# Patient Record
Sex: FEMALE | Race: WHITE | Employment: OTHER | URBAN - NONMETROPOLITAN AREA
[De-identification: names, ages, dates, MRNs, and addresses within clinical notes are randomized per-mention and may not be internally consistent; named-entity substitution may affect disease eponyms.]

---

## 2024-04-18 NOTE — PROGRESS NOTES
PAT VISIT  Appointment reminder call given  Date: 4/24  Arrival time: 14:00 and location; [facility]   Bring Drivers license and insurance  Bring Medications in original bottles  Please be ready to give Urine Sample  If possible bring caregiver for appointment  Take am medications with water unless you are holding any for surgery  Appointment may last 2 hours

## 2024-04-30 ENCOUNTER — ANESTHESIA EVENT (OUTPATIENT)
Dept: OPERATING ROOM | Age: 69
End: 2024-04-30
Payer: MEDICARE

## 2024-05-01 ENCOUNTER — ANESTHESIA (OUTPATIENT)
Dept: OPERATING ROOM | Age: 69
End: 2024-05-01
Payer: MEDICARE

## 2024-05-01 ENCOUNTER — HOSPITAL ENCOUNTER (INPATIENT)
Age: 69
LOS: 1 days | Discharge: NURSING, REHAB OR LONG-TERM CARE | DRG: 454 | End: 2024-05-07
Attending: ORTHOPAEDIC SURGERY | Admitting: ORTHOPAEDIC SURGERY
Payer: MEDICARE

## 2024-05-01 ENCOUNTER — APPOINTMENT (OUTPATIENT)
Dept: GENERAL RADIOLOGY | Age: 69
DRG: 454 | End: 2024-05-01
Attending: ORTHOPAEDIC SURGERY
Payer: MEDICARE

## 2024-05-01 DIAGNOSIS — Z98.1 S/P LUMBAR SPINAL FUSION: ICD-10-CM

## 2024-05-01 DIAGNOSIS — M79.661 BILATERAL CALF PAIN: Primary | ICD-10-CM

## 2024-05-01 DIAGNOSIS — M79.662 BILATERAL CALF PAIN: Primary | ICD-10-CM

## 2024-05-01 PROBLEM — M48.062 SPINAL STENOSIS OF LUMBAR REGION WITH NEUROGENIC CLAUDICATION: Status: ACTIVE | Noted: 2024-05-01

## 2024-05-01 LAB
ABO: NORMAL
ANTIBODY SCREEN: NORMAL
BASOPHILS ABSOLUTE: 0 THOU/MM3 (ref 0–0.1)
BASOPHILS NFR BLD AUTO: 0.2 %
DEPRECATED RDW RBC AUTO: 43.3 FL (ref 35–45)
EOSINOPHIL NFR BLD AUTO: 1.2 %
EOSINOPHILS ABSOLUTE: 0.1 THOU/MM3 (ref 0–0.4)
ERYTHROCYTE [DISTWIDTH] IN BLOOD BY AUTOMATED COUNT: 13.2 % (ref 11.5–14.5)
GLUCOSE BLD STRIP.AUTO-MCNC: 265 MG/DL (ref 70–108)
GLUCOSE BLD STRIP.AUTO-MCNC: 276 MG/DL (ref 70–108)
GLUCOSE BLD STRIP.AUTO-MCNC: 294 MG/DL (ref 70–108)
GLUCOSE BLD STRIP.AUTO-MCNC: 299 MG/DL (ref 70–108)
GLUCOSE BLD STRIP.AUTO-MCNC: 308 MG/DL (ref 70–108)
HCT VFR BLD AUTO: 26.4 % (ref 37–47)
HGB BLD-MCNC: 8.9 GM/DL (ref 12–16)
IMM GRANULOCYTES # BLD AUTO: 0.01 THOU/MM3 (ref 0–0.07)
IMM GRANULOCYTES NFR BLD AUTO: 0.2 %
LYMPHOCYTES ABSOLUTE: 1.2 THOU/MM3 (ref 1–4.8)
LYMPHOCYTES NFR BLD AUTO: 25.8 %
MCH RBC QN AUTO: 30.5 PG (ref 26–33)
MCHC RBC AUTO-ENTMCNC: 33.7 GM/DL (ref 32.2–35.5)
MCV RBC AUTO: 90.4 FL (ref 81–99)
MONOCYTES ABSOLUTE: 0.3 THOU/MM3 (ref 0.4–1.3)
MONOCYTES NFR BLD AUTO: 6.4 %
NEUTROPHILS ABSOLUTE: 3.2 THOU/MM3 (ref 1.8–7.7)
NEUTROPHILS NFR BLD AUTO: 66.2 %
NRBC BLD AUTO-RTO: 0 /100 WBC
PLATELET # BLD AUTO: 176 THOU/MM3 (ref 130–400)
PMV BLD AUTO: 9.2 FL (ref 9.4–12.4)
POTASSIUM SERPL-SCNC: 4.5 MEQ/L (ref 3.5–5.2)
RBC # BLD AUTO: 2.92 MILL/MM3 (ref 4.2–5.4)
RH FACTOR: NORMAL
WBC # BLD AUTO: 4.8 THOU/MM3 (ref 4.8–10.8)

## 2024-05-01 PROCEDURE — 3700000001 HC ADD 15 MINUTES (ANESTHESIA): Performed by: ORTHOPAEDIC SURGERY

## 2024-05-01 PROCEDURE — 6360000002 HC RX W HCPCS: Performed by: NURSE ANESTHETIST, CERTIFIED REGISTERED

## 2024-05-01 PROCEDURE — 72020 X-RAY EXAM OF SPINE 1 VIEW: CPT

## 2024-05-01 PROCEDURE — 36415 COLL VENOUS BLD VENIPUNCTURE: CPT

## 2024-05-01 PROCEDURE — 01NB0ZZ RELEASE LUMBAR NERVE, OPEN APPROACH: ICD-10-PCS | Performed by: ORTHOPAEDIC SURGERY

## 2024-05-01 PROCEDURE — 6370000000 HC RX 637 (ALT 250 FOR IP): Performed by: NURSE PRACTITIONER

## 2024-05-01 PROCEDURE — 0SG1071 FUSION OF 2 OR MORE LUMBAR VERTEBRAL JOINTS WITH AUTOLOGOUS TISSUE SUBSTITUTE, POSTERIOR APPROACH, POSTERIOR COLUMN, OPEN APPROACH: ICD-10-PCS | Performed by: ORTHOPAEDIC SURGERY

## 2024-05-01 PROCEDURE — C1889 IMPLANT/INSERT DEVICE, NOC: HCPCS | Performed by: ORTHOPAEDIC SURGERY

## 2024-05-01 PROCEDURE — 2709999900 HC NON-CHARGEABLE SUPPLY: Performed by: ORTHOPAEDIC SURGERY

## 2024-05-01 PROCEDURE — 2500000003 HC RX 250 WO HCPCS: Performed by: NURSE ANESTHETIST, CERTIFIED REGISTERED

## 2024-05-01 PROCEDURE — 86901 BLOOD TYPING SEROLOGIC RH(D): CPT

## 2024-05-01 PROCEDURE — 7100000001 HC PACU RECOVERY - ADDTL 15 MIN: Performed by: ORTHOPAEDIC SURGERY

## 2024-05-01 PROCEDURE — 0SG30AJ FUSION OF LUMBOSACRAL JOINT WITH INTERBODY FUSION DEVICE, POSTERIOR APPROACH, ANTERIOR COLUMN, OPEN APPROACH: ICD-10-PCS | Performed by: ORTHOPAEDIC SURGERY

## 2024-05-01 PROCEDURE — 0SG3071 FUSION OF LUMBOSACRAL JOINT WITH AUTOLOGOUS TISSUE SUBSTITUTE, POSTERIOR APPROACH, POSTERIOR COLUMN, OPEN APPROACH: ICD-10-PCS | Performed by: ORTHOPAEDIC SURGERY

## 2024-05-01 PROCEDURE — 6360000002 HC RX W HCPCS: Performed by: PHYSICIAN ASSISTANT

## 2024-05-01 PROCEDURE — 3600000014 HC SURGERY LEVEL 4 ADDTL 15MIN: Performed by: ORTHOPAEDIC SURGERY

## 2024-05-01 PROCEDURE — 3600000004 HC SURGERY LEVEL 4 BASE: Performed by: ORTHOPAEDIC SURGERY

## 2024-05-01 PROCEDURE — 7100000000 HC PACU RECOVERY - FIRST 15 MIN: Performed by: ORTHOPAEDIC SURGERY

## 2024-05-01 PROCEDURE — 0SP304Z REMOVAL OF INTERNAL FIXATION DEVICE FROM LUMBOSACRAL JOINT, OPEN APPROACH: ICD-10-PCS | Performed by: ORTHOPAEDIC SURGERY

## 2024-05-01 PROCEDURE — 72100 X-RAY EXAM L-S SPINE 2/3 VWS: CPT

## 2024-05-01 PROCEDURE — 0ST40ZZ RESECTION OF LUMBOSACRAL DISC, OPEN APPROACH: ICD-10-PCS | Performed by: ORTHOPAEDIC SURGERY

## 2024-05-01 PROCEDURE — 86900 BLOOD TYPING SEROLOGIC ABO: CPT

## 2024-05-01 PROCEDURE — 2580000003 HC RX 258: Performed by: NURSE PRACTITIONER

## 2024-05-01 PROCEDURE — 2720000010 HC SURG SUPPLY STERILE: Performed by: ORTHOPAEDIC SURGERY

## 2024-05-01 PROCEDURE — G0378 HOSPITAL OBSERVATION PER HR: HCPCS

## 2024-05-01 PROCEDURE — 2580000003 HC RX 258: Performed by: NURSE ANESTHETIST, CERTIFIED REGISTERED

## 2024-05-01 PROCEDURE — 2580000003 HC RX 258: Performed by: PHYSICIAN ASSISTANT

## 2024-05-01 PROCEDURE — 6360000002 HC RX W HCPCS: Performed by: NURSE PRACTITIONER

## 2024-05-01 PROCEDURE — C1713 ANCHOR/SCREW BN/BN,TIS/BN: HCPCS | Performed by: ORTHOPAEDIC SURGERY

## 2024-05-01 PROCEDURE — 85025 COMPLETE CBC W/AUTO DIFF WBC: CPT

## 2024-05-01 PROCEDURE — 82948 REAGENT STRIP/BLOOD GLUCOSE: CPT

## 2024-05-01 PROCEDURE — 6370000000 HC RX 637 (ALT 250 FOR IP): Performed by: ANESTHESIOLOGY

## 2024-05-01 PROCEDURE — 86850 RBC ANTIBODY SCREEN: CPT

## 2024-05-01 PROCEDURE — 84132 ASSAY OF SERUM POTASSIUM: CPT

## 2024-05-01 PROCEDURE — 3700000000 HC ANESTHESIA ATTENDED CARE: Performed by: ORTHOPAEDIC SURGERY

## 2024-05-01 PROCEDURE — 86923 COMPATIBILITY TEST ELECTRIC: CPT

## 2024-05-01 DEVICE — ROD, TI, PREBENT, 5.5X120
Type: IMPLANTABLE DEVICE | Site: SPINE LUMBAR | Status: FUNCTIONAL
Brand: CORTERA

## 2024-05-01 DEVICE — SCREW, POLY, SOLID, 6.5X45
Type: IMPLANTABLE DEVICE | Site: SPINE LUMBAR | Status: FUNCTIONAL
Brand: CORTERA

## 2024-05-01 DEVICE — CAGE SPNL 6 DEG 26X10X10 MM TIPLUS TECHNOLOGY FORTILINK-TS: Type: IMPLANTABLE DEVICE | Site: SPINE LUMBAR | Status: FUNCTIONAL

## 2024-05-01 DEVICE — GRAFT BNE 10 CC DBM FIBREX: Type: IMPLANTABLE DEVICE | Site: SPINE LUMBAR | Status: FUNCTIONAL

## 2024-05-01 DEVICE — ALLOGRAFT BNE BRIDGE 100X25 MM 46 CC BIOADAPT: Type: IMPLANTABLE DEVICE | Site: SPINE LUMBAR | Status: FUNCTIONAL

## 2024-05-01 DEVICE — SET SCREW, 5.5-6.0
Type: IMPLANTABLE DEVICE | Site: SPINE LUMBAR | Status: FUNCTIONAL
Brand: CORTERA

## 2024-05-01 DEVICE — SCREW, POLY, SOLID, 5.5X45
Type: IMPLANTABLE DEVICE | Site: SPINE LUMBAR | Status: FUNCTIONAL
Brand: CORTERA

## 2024-05-01 DEVICE — SCREW, POLY, SOLID, 7.5X45
Type: IMPLANTABLE DEVICE | Site: SPINE LUMBAR | Status: FUNCTIONAL
Brand: CORTERA

## 2024-05-01 DEVICE — GRAFT BNE LG: Type: IMPLANTABLE DEVICE | Site: SPINE LUMBAR | Status: FUNCTIONAL

## 2024-05-01 RX ORDER — MIDAZOLAM HYDROCHLORIDE 1 MG/ML
INJECTION INTRAMUSCULAR; INTRAVENOUS PRN
Status: DISCONTINUED | OUTPATIENT
Start: 2024-05-01 | End: 2024-05-01 | Stop reason: SDUPTHER

## 2024-05-01 RX ORDER — ONDANSETRON 4 MG/1
4 TABLET, ORALLY DISINTEGRATING ORAL EVERY 8 HOURS PRN
Status: DISCONTINUED | OUTPATIENT
Start: 2024-05-01 | End: 2024-05-07 | Stop reason: HOSPADM

## 2024-05-01 RX ORDER — ENEMA 19; 7 G/133ML; G/133ML
1 ENEMA RECTAL DAILY PRN
Status: DISCONTINUED | OUTPATIENT
Start: 2024-05-01 | End: 2024-05-07 | Stop reason: HOSPADM

## 2024-05-01 RX ORDER — MORPHINE SULFATE 2 MG/ML
2 INJECTION, SOLUTION INTRAMUSCULAR; INTRAVENOUS
Status: ACTIVE | OUTPATIENT
Start: 2024-05-01 | End: 2024-05-02

## 2024-05-01 RX ORDER — TRANEXAMIC ACID 100 MG/ML
INJECTION, SOLUTION INTRAVENOUS PRN
Status: DISCONTINUED | OUTPATIENT
Start: 2024-05-01 | End: 2024-05-01 | Stop reason: SDUPTHER

## 2024-05-01 RX ORDER — SODIUM CHLORIDE 0.9 % (FLUSH) 0.9 %
5-40 SYRINGE (ML) INJECTION EVERY 12 HOURS SCHEDULED
Status: DISCONTINUED | OUTPATIENT
Start: 2024-05-01 | End: 2024-05-07 | Stop reason: HOSPADM

## 2024-05-01 RX ORDER — POLYETHYLENE GLYCOL 3350 17 G/17G
17 POWDER, FOR SOLUTION ORAL DAILY
Status: DISCONTINUED | OUTPATIENT
Start: 2024-05-01 | End: 2024-05-07 | Stop reason: HOSPADM

## 2024-05-01 RX ORDER — SODIUM CHLORIDE 9 MG/ML
INJECTION, SOLUTION INTRAVENOUS CONTINUOUS
Status: DISCONTINUED | OUTPATIENT
Start: 2024-05-01 | End: 2024-05-02

## 2024-05-01 RX ORDER — INSULIN LISPRO 100 [IU]/ML
0-8 INJECTION, SOLUTION INTRAVENOUS; SUBCUTANEOUS
Status: DISCONTINUED | OUTPATIENT
Start: 2024-05-01 | End: 2024-05-03

## 2024-05-01 RX ORDER — SODIUM CHLORIDE 0.9 % (FLUSH) 0.9 %
5-40 SYRINGE (ML) INJECTION PRN
Status: DISCONTINUED | OUTPATIENT
Start: 2024-05-01 | End: 2024-05-07 | Stop reason: HOSPADM

## 2024-05-01 RX ORDER — ONDANSETRON 2 MG/ML
4 INJECTION INTRAMUSCULAR; INTRAVENOUS EVERY 6 HOURS PRN
Status: DISCONTINUED | OUTPATIENT
Start: 2024-05-01 | End: 2024-05-07 | Stop reason: HOSPADM

## 2024-05-01 RX ORDER — SENNOSIDES A AND B 8.6 MG/1
1 TABLET, FILM COATED ORAL NIGHTLY
Status: DISCONTINUED | OUTPATIENT
Start: 2024-05-01 | End: 2024-05-03

## 2024-05-01 RX ORDER — SODIUM CHLORIDE 0.9 % (FLUSH) 0.9 %
5-40 SYRINGE (ML) INJECTION PRN
Status: DISCONTINUED | OUTPATIENT
Start: 2024-05-01 | End: 2024-05-01 | Stop reason: HOSPADM

## 2024-05-01 RX ORDER — SODIUM CHLORIDE 9 MG/ML
INJECTION, SOLUTION INTRAVENOUS CONTINUOUS PRN
Status: DISCONTINUED | OUTPATIENT
Start: 2024-05-01 | End: 2024-05-01 | Stop reason: SDUPTHER

## 2024-05-01 RX ORDER — BETHANECHOL CHLORIDE 25 MG/1
25 TABLET ORAL 3 TIMES DAILY
Status: DISCONTINUED | OUTPATIENT
Start: 2024-05-01 | End: 2024-05-07 | Stop reason: HOSPADM

## 2024-05-01 RX ORDER — ONDANSETRON 2 MG/ML
INJECTION INTRAMUSCULAR; INTRAVENOUS PRN
Status: DISCONTINUED | OUTPATIENT
Start: 2024-05-01 | End: 2024-05-01 | Stop reason: SDUPTHER

## 2024-05-01 RX ORDER — FENTANYL CITRATE 50 UG/ML
INJECTION, SOLUTION INTRAMUSCULAR; INTRAVENOUS PRN
Status: DISCONTINUED | OUTPATIENT
Start: 2024-05-01 | End: 2024-05-01 | Stop reason: SDUPTHER

## 2024-05-01 RX ORDER — PROPOFOL 10 MG/ML
INJECTION, EMULSION INTRAVENOUS PRN
Status: DISCONTINUED | OUTPATIENT
Start: 2024-05-01 | End: 2024-05-01 | Stop reason: SDUPTHER

## 2024-05-01 RX ORDER — GLYCOPYRROLATE 0.2 MG/ML
INJECTION INTRAMUSCULAR; INTRAVENOUS PRN
Status: DISCONTINUED | OUTPATIENT
Start: 2024-05-01 | End: 2024-05-01 | Stop reason: SDUPTHER

## 2024-05-01 RX ORDER — LIDOCAINE HYDROCHLORIDE 20 MG/ML
INJECTION, SOLUTION INTRAVENOUS PRN
Status: DISCONTINUED | OUTPATIENT
Start: 2024-05-01 | End: 2024-05-01 | Stop reason: SDUPTHER

## 2024-05-01 RX ORDER — SODIUM CHLORIDE 9 MG/ML
INJECTION, SOLUTION INTRAVENOUS CONTINUOUS
Status: DISCONTINUED | OUTPATIENT
Start: 2024-05-01 | End: 2024-05-01 | Stop reason: HOSPADM

## 2024-05-01 RX ORDER — OXYCODONE HYDROCHLORIDE AND ACETAMINOPHEN 5; 325 MG/1; MG/1
2 TABLET ORAL EVERY 4 HOURS PRN
Status: DISCONTINUED | OUTPATIENT
Start: 2024-05-01 | End: 2024-05-07 | Stop reason: HOSPADM

## 2024-05-01 RX ORDER — SODIUM CHLORIDE 0.9 % (FLUSH) 0.9 %
5-40 SYRINGE (ML) INJECTION EVERY 12 HOURS SCHEDULED
Status: DISCONTINUED | OUTPATIENT
Start: 2024-05-01 | End: 2024-05-01 | Stop reason: HOSPADM

## 2024-05-01 RX ORDER — SODIUM CHLORIDE 9 MG/ML
INJECTION, SOLUTION INTRAVENOUS PRN
Status: DISCONTINUED | OUTPATIENT
Start: 2024-05-01 | End: 2024-05-07 | Stop reason: HOSPADM

## 2024-05-01 RX ORDER — DEXAMETHASONE SODIUM PHOSPHATE 10 MG/ML
INJECTION, EMULSION INTRAMUSCULAR; INTRAVENOUS PRN
Status: DISCONTINUED | OUTPATIENT
Start: 2024-05-01 | End: 2024-05-01 | Stop reason: SDUPTHER

## 2024-05-01 RX ORDER — METHENAMINE HIPPURATE 1000 MG/1
1 TABLET ORAL 2 TIMES DAILY WITH MEALS
Status: DISCONTINUED | OUTPATIENT
Start: 2024-05-01 | End: 2024-05-07 | Stop reason: HOSPADM

## 2024-05-01 RX ORDER — ACETAMINOPHEN 325 MG/1
650 TABLET ORAL EVERY 4 HOURS PRN
Status: DISCONTINUED | OUTPATIENT
Start: 2024-05-01 | End: 2024-05-07 | Stop reason: HOSPADM

## 2024-05-01 RX ORDER — ATENOLOL 50 MG/1
50 TABLET ORAL DAILY
Status: DISCONTINUED | OUTPATIENT
Start: 2024-05-02 | End: 2024-05-07 | Stop reason: HOSPADM

## 2024-05-01 RX ORDER — DOCUSATE SODIUM 100 MG/1
100 CAPSULE, LIQUID FILLED ORAL 2 TIMES DAILY
Status: DISCONTINUED | OUTPATIENT
Start: 2024-05-01 | End: 2024-05-07 | Stop reason: HOSPADM

## 2024-05-01 RX ORDER — MORPHINE SULFATE 4 MG/ML
4 INJECTION, SOLUTION INTRAMUSCULAR; INTRAVENOUS
Status: ACTIVE | OUTPATIENT
Start: 2024-05-01 | End: 2024-05-02

## 2024-05-01 RX ORDER — LISINOPRIL 5 MG/1
5 TABLET ORAL DAILY
Status: DISCONTINUED | OUTPATIENT
Start: 2024-05-01 | End: 2024-05-07 | Stop reason: HOSPADM

## 2024-05-01 RX ORDER — ROCURONIUM BROMIDE 10 MG/ML
INJECTION, SOLUTION INTRAVENOUS PRN
Status: DISCONTINUED | OUTPATIENT
Start: 2024-05-01 | End: 2024-05-01 | Stop reason: SDUPTHER

## 2024-05-01 RX ORDER — DEXTROSE MONOHYDRATE 100 MG/ML
INJECTION, SOLUTION INTRAVENOUS CONTINUOUS PRN
Status: DISCONTINUED | OUTPATIENT
Start: 2024-05-01 | End: 2024-05-07 | Stop reason: HOSPADM

## 2024-05-01 RX ORDER — TAMSULOSIN HYDROCHLORIDE 0.4 MG/1
0.4 CAPSULE ORAL DAILY
Status: DISCONTINUED | OUTPATIENT
Start: 2024-05-01 | End: 2024-05-03

## 2024-05-01 RX ORDER — BISACODYL 10 MG
10 SUPPOSITORY, RECTAL RECTAL DAILY PRN
Status: DISCONTINUED | OUTPATIENT
Start: 2024-05-01 | End: 2024-05-06

## 2024-05-01 RX ORDER — OXYCODONE HYDROCHLORIDE AND ACETAMINOPHEN 5; 325 MG/1; MG/1
1 TABLET ORAL EVERY 4 HOURS PRN
Status: DISCONTINUED | OUTPATIENT
Start: 2024-05-01 | End: 2024-05-07 | Stop reason: HOSPADM

## 2024-05-01 RX ORDER — INSULIN LISPRO 100 [IU]/ML
0-4 INJECTION, SOLUTION INTRAVENOUS; SUBCUTANEOUS NIGHTLY
Status: DISCONTINUED | OUTPATIENT
Start: 2024-05-01 | End: 2024-05-03

## 2024-05-01 RX ORDER — CYCLOBENZAPRINE HCL 10 MG
10 TABLET ORAL 3 TIMES DAILY PRN
Status: DISCONTINUED | OUTPATIENT
Start: 2024-05-01 | End: 2024-05-07 | Stop reason: HOSPADM

## 2024-05-01 RX ORDER — PHENYLEPHRINE HCL IN 0.9% NACL 1 MG/10 ML
SYRINGE (ML) INTRAVENOUS PRN
Status: DISCONTINUED | OUTPATIENT
Start: 2024-05-01 | End: 2024-05-01 | Stop reason: SDUPTHER

## 2024-05-01 RX ADMIN — SODIUM CHLORIDE: 9 INJECTION, SOLUTION INTRAVENOUS at 11:30

## 2024-05-01 RX ADMIN — FENTANYL CITRATE 50 MCG: 50 INJECTION, SOLUTION INTRAMUSCULAR; INTRAVENOUS at 08:53

## 2024-05-01 RX ADMIN — FENTANYL CITRATE 50 MCG: 50 INJECTION, SOLUTION INTRAMUSCULAR; INTRAVENOUS at 13:05

## 2024-05-01 RX ADMIN — ROCURONIUM BROMIDE 10 MG: 10 INJECTION INTRAVENOUS at 09:49

## 2024-05-01 RX ADMIN — BETHANECHOL CHLORIDE 25 MG: 25 TABLET ORAL at 20:19

## 2024-05-01 RX ADMIN — METFORMIN HYDROCHLORIDE 1000 MG: 500 TABLET ORAL at 15:35

## 2024-05-01 RX ADMIN — ROCURONIUM BROMIDE 10 MG: 10 INJECTION INTRAVENOUS at 11:34

## 2024-05-01 RX ADMIN — PROPOFOL 150 MG: 10 INJECTION, EMULSION INTRAVENOUS at 08:53

## 2024-05-01 RX ADMIN — SUGAMMADEX 200 MG: 100 INJECTION, SOLUTION INTRAVENOUS at 13:24

## 2024-05-01 RX ADMIN — SODIUM CHLORIDE: 9 INJECTION, SOLUTION INTRAVENOUS at 07:31

## 2024-05-01 RX ADMIN — OXYCODONE HYDROCHLORIDE AND ACETAMINOPHEN 2 TABLET: 5; 325 TABLET ORAL at 20:19

## 2024-05-01 RX ADMIN — GLYCOPYRROLATE 0.2 MG: 0.2 INJECTION INTRAMUSCULAR; INTRAVENOUS at 11:28

## 2024-05-01 RX ADMIN — ROCURONIUM BROMIDE 10 MG: 10 INJECTION INTRAVENOUS at 10:14

## 2024-05-01 RX ADMIN — ROCURONIUM BROMIDE 10 MG: 10 INJECTION INTRAVENOUS at 10:45

## 2024-05-01 RX ADMIN — SODIUM CHLORIDE: 9 INJECTION, SOLUTION INTRAVENOUS at 08:59

## 2024-05-01 RX ADMIN — METHENAMINE HIPPURATE 1 G: 1 TABLET ORAL at 15:36

## 2024-05-01 RX ADMIN — VANCOMYCIN HYDROCHLORIDE 1000 MG: 1 INJECTION, POWDER, LYOPHILIZED, FOR SOLUTION INTRAVENOUS at 08:15

## 2024-05-01 RX ADMIN — FENTANYL CITRATE 50 MCG: 50 INJECTION, SOLUTION INTRAMUSCULAR; INTRAVENOUS at 10:05

## 2024-05-01 RX ADMIN — FENTANYL CITRATE 50 MCG: 50 INJECTION, SOLUTION INTRAMUSCULAR; INTRAVENOUS at 09:28

## 2024-05-01 RX ADMIN — POLYETHYLENE GLYCOL 3350 17 G: 17 POWDER, FOR SOLUTION ORAL at 15:34

## 2024-05-01 RX ADMIN — WATER 2000 MG: 1 INJECTION INTRAMUSCULAR; INTRAVENOUS; SUBCUTANEOUS at 12:59

## 2024-05-01 RX ADMIN — INSULIN LISPRO 4 UNITS: 100 INJECTION, SOLUTION INTRAVENOUS; SUBCUTANEOUS at 20:19

## 2024-05-01 RX ADMIN — BETHANECHOL CHLORIDE 25 MG: 25 TABLET ORAL at 15:35

## 2024-05-01 RX ADMIN — ROCURONIUM BROMIDE 50 MG: 10 INJECTION INTRAVENOUS at 08:53

## 2024-05-01 RX ADMIN — TRANEXAMIC ACID 1000 MG: 100 INJECTION, SOLUTION INTRAVENOUS at 09:21

## 2024-05-01 RX ADMIN — Medication 6 UNITS: at 07:32

## 2024-05-01 RX ADMIN — DEXAMETHASONE SODIUM PHOSPHATE 10 MG: 10 INJECTION, EMULSION INTRAMUSCULAR; INTRAVENOUS at 09:07

## 2024-05-01 RX ADMIN — SODIUM CHLORIDE: 9 INJECTION, SOLUTION INTRAVENOUS at 10:21

## 2024-05-01 RX ADMIN — SUGAMMADEX 200 MG: 100 INJECTION, SOLUTION INTRAVENOUS at 13:09

## 2024-05-01 RX ADMIN — WATER 2000 MG: 1 INJECTION INTRAMUSCULAR; INTRAVENOUS; SUBCUTANEOUS at 08:59

## 2024-05-01 RX ADMIN — DOCUSATE SODIUM 100 MG: 100 CAPSULE, LIQUID FILLED ORAL at 20:19

## 2024-05-01 RX ADMIN — INSULIN LISPRO 4 UNITS: 100 INJECTION, SOLUTION INTRAVENOUS; SUBCUTANEOUS at 15:34

## 2024-05-01 RX ADMIN — Medication 100 MCG: at 10:24

## 2024-05-01 RX ADMIN — ROCURONIUM BROMIDE 10 MG: 10 INJECTION INTRAVENOUS at 09:29

## 2024-05-01 RX ADMIN — LIDOCAINE HYDROCHLORIDE 100 MG: 20 INJECTION, SOLUTION INTRAVENOUS at 08:53

## 2024-05-01 RX ADMIN — TAMSULOSIN HYDROCHLORIDE 0.4 MG: 0.4 CAPSULE ORAL at 15:35

## 2024-05-01 RX ADMIN — SENNOSIDES 8.6 MG: 8.6 TABLET, FILM COATED ORAL at 20:19

## 2024-05-01 RX ADMIN — ONDANSETRON 4 MG: 2 INJECTION INTRAMUSCULAR; INTRAVENOUS at 11:53

## 2024-05-01 RX ADMIN — MIDAZOLAM 2 MG: 1 INJECTION INTRAMUSCULAR; INTRAVENOUS at 08:50

## 2024-05-01 RX ADMIN — ROCURONIUM BROMIDE 10 MG: 10 INJECTION INTRAVENOUS at 12:11

## 2024-05-01 RX ADMIN — WATER 2000 MG: 1 INJECTION INTRAMUSCULAR; INTRAVENOUS; SUBCUTANEOUS at 20:20

## 2024-05-01 ASSESSMENT — PAIN - FUNCTIONAL ASSESSMENT
PAIN_FUNCTIONAL_ASSESSMENT: 0-10
PAIN_FUNCTIONAL_ASSESSMENT: 0-10
PAIN_FUNCTIONAL_ASSESSMENT: PREVENTS OR INTERFERES SOME ACTIVE ACTIVITIES AND ADLS

## 2024-05-01 ASSESSMENT — PAIN DESCRIPTION - PAIN TYPE: TYPE: SURGICAL PAIN

## 2024-05-01 ASSESSMENT — PAIN DESCRIPTION - LOCATION: LOCATION: BACK

## 2024-05-01 ASSESSMENT — PAIN SCALES - GENERAL
PAINLEVEL_OUTOF10: 10
PAINLEVEL_OUTOF10: 4
PAINLEVEL_OUTOF10: 3

## 2024-05-01 ASSESSMENT — PAIN DESCRIPTION - DESCRIPTORS
DESCRIPTORS: ACHING;DISCOMFORT
DESCRIPTORS: ACHING;DISCOMFORT
DESCRIPTORS: ACHING;SHARP

## 2024-05-01 ASSESSMENT — PAIN DESCRIPTION - ORIENTATION: ORIENTATION: LOWER

## 2024-05-01 ASSESSMENT — PAIN DESCRIPTION - ONSET: ONSET: ON-GOING

## 2024-05-01 ASSESSMENT — PAIN DESCRIPTION - FREQUENCY: FREQUENCY: CONTINUOUS

## 2024-05-01 NOTE — H&P
I have reviewed the history and physical and examined the patient.  I find no relevant changes.  I have reviewed with the patient and/or family members, during the preoperative office visit the risks, benefits, and alternatives to the procedure.    Gentry Ortiz MD

## 2024-05-01 NOTE — ANESTHESIA PRE PROCEDURE
Department of Anesthesiology  Preprocedure Note       Name:  Tawny Rico   Age:  69 y.o.  :  1955                                          MRN:  [id number]         Date:  2024      Surgeon: Surgeon(s):  Gentry Ortiz MD    Procedure: Procedure(s):  Removal of Hardware L4-L5, L2-L4 L5-S1 Laminectomy L2-S1 PSD with Legacy Removal/ Cortera    Medications prior to admission:   Prior to Admission medications    Medication Sig Start Date End Date Taking? Authorizing Provider   methenamine (HIPREX) 1 g tablet Take 1 tablet by mouth 2 times daily (with meals)    Socorro Ashton MD   vitamin B-12 (CYANOCOBALAMIN) 1000 MCG tablet Take 1 tablet by mouth daily    Socorro Ashton MD   SITagliptin (JANUVIA) 100 MG tablet Take 1 tablet by mouth daily    Socorro Ashton MD   metFORMIN (GLUCOPHAGE) 500 MG tablet Take 2 tablets by mouth 2 times daily (with meals)    Socorro Ashton MD   lisinopril (PRINIVIL;ZESTRIL) 5 MG tablet Take 1 tablet by mouth daily    Socorro Ashton MD   atenolol (TENORMIN) 50 MG tablet Take 1 tablet by mouth daily    Socorro Ashton MD   Vitamin D3 125 MCG (5000 UT) TABS tablet Take 1 tablet by mouth daily    Socorro Ashton MD   aspirin 81 MG EC tablet Take 1 tablet by mouth daily    Socorro Ashton MD   tamsulosin (FLOMAX) 0.4 MG capsule Take 1 capsule by mouth daily    Socorro Ashton MD   ibuprofen (ADVIL;MOTRIN) 200 MG tablet Take 1 tablet by mouth every 6 hours as needed for Pain    Socorro Ashton MD   bumetanide (BUMEX) 0.5 MG tablet Take 1 tablet by mouth daily    Socorro Ashton MD   bethanechol (URECHOLINE) 25 MG tablet Take 1 tablet by mouth 3 times daily    Socorro Ashton MD   glipiZIDE (GLUCOTROL) 5 MG tablet Take 1 tablet by mouth 2 times daily (before meals)    Socorro Ashton MD   Multiple Vitamins-Minerals (THERAPEUTIC MULTIVITAMIN-MINERALS) tablet Take 1 tablet by mouth daily    Daisha

## 2024-05-01 NOTE — ANESTHESIA POSTPROCEDURE EVALUATION
Department of Anesthesiology  Postprocedure Note    Patient: Tawny Rico  MRN: [id number]  YOB: 1955  Date of evaluation: 5/1/2024    Procedure Summary       Date: 05/01/24 Room / Location: [facility] OR [address] [place] [address] OR    Anesthesia Start: 0850 Anesthesia Stop: 1329    Procedure: Removal of Hardware Lumbar 4-Lumbar 5, Lumbar 5 to Sacral 1 Posterior Latereal Interbody Fusion, Lumbar 2 to Lumbar 4 Laminectomy and Posterior Spinal Fusion with Local Bone Graft, Demineralized Bone Matrix and Instrumentation (Spine Lumbar) Diagnosis:       Spinal stenosis of lumbar region, unspecified whether neurogenic claudication present      (Spinal stenosis of lumbar region, unspecified whether neurogenic claudication present [M48.061])    Surgeons: Gentry Ortiz MD Responsible Provider: Jed Jeffries DO    Anesthesia Type: general ASA Status: 2            Anesthesia Type: No value filed.    Matthieu Phase I: Matthieu Score: 9    Matthieu Phase II:      Anesthesia Post Evaluation    Patient location during evaluation: PACU  Patient participation: complete - patient participated  Level of consciousness: awake and alert  Airway patency: patent  Nausea & Vomiting: no vomiting and no nausea  Cardiovascular status: hemodynamically stable  Respiratory status: acceptable and room air  Hydration status: stable  Pain management: adequate    No notable events documented.

## 2024-05-01 NOTE — BRIEF OP NOTE
Brief Postoperative Note      Patient: Tawny Rico  YOB: 1955  MRN: [id number]    Date of Procedure: 5/1/2024    Pre-Op Diagnosis Codes:     * Spinal stenosis of lumbar region, unspecified whether neurogenic claudication present [M48.061]    Post-Op Diagnosis: Same       Procedure(s):  Removal of Hardware Lumbar 4-Lumbar 5, Lumbar 5 to Sacral 1 Posterior Latereal Interbody Fusion, Lumbar 2 to Lumbar 4 Laminectomy and Posterior Spinal Fusion with Local Bone Graft, Demineralized Bone Matrix and Instrumentation    Surgeon(s):  Gentry Ortiz MD    Assistant:  Gisel Matthews CNP-FAC    Anesthesia: General    Estimated Blood Loss (mL): 1000    Complications: None    Specimens:   * No specimens in log *    Implants:  Implant Name Type Inv. Item Serial No.  Lot No. LRB No. Used Action   CAGE SPNL 6 DEG [id number] MM [employer] FORT[employer]-TS - [id number]  CAGE SPNL 6 DEG [id number] MM TIPLUS TECHNOLOGY FORTILINK-TS  [employer] [id number] N/A 2 Implanted   GRAFT BNE LG - [id number]  GRAFT BNE LG [id number] BIOLOGICA  N/A 1 Implanted   GRAFT BNE LG - [id number]  GRAFT BNE LG [id number] BIOLOGICA  N/A 1 Implanted   GRAFT BNE 10 CC DBM FIBREX - [id number]  GRAFT BNE 10 CC DBM FIBREX  [employer]  N/A 1 Implanted   ALLOGRAFT BNE BRIDGE 100X25 MM 46 CC BIOADAPT - [id number]  ALLOGRAFT BNE BRIDGE 100X25 MM 46 CC BIOADAPT [id number] [employer] [id number] N/A 1 Implanted   SCREW POLY SOLID 5.5X45 - [id number]  SCREW POLY SOLID 5.5X45  [employer]  N/A 4 Implanted   SCREW POLY SOLID 6.5X45 - [id number]  SCREW POLY SOLID 6.5X45  [employer]  N/A 4 Implanted   SCREW POLY SOLID 7.5X45 - [id number]  SCREW POLY SOLID 7.5X45  [employer]  N/A 2 Implanted   TING TI PREBENT 5.5X120 - [id number]  TING TI PREBENT 5.5X120  [employer]  N/A 2 Implanted   SET SCREW

## 2024-05-01 NOTE — PROGRESS NOTES
1321  - pt arrives to pacu, respirations unlabored on 8 L simple mask, pt states pain 4 out of 10, VSS    1325 - pt given sugammadex by CRNA    1340 - pt resting comfortably, pt states pain tolerable    1345 -report called to 7K RN    1351 - pt meets criteria for discharge from pacu at this time, pt transported to K16 in stable condition

## 2024-05-02 LAB
BASOPHILS ABSOLUTE: 0 THOU/MM3 (ref 0–0.1)
BASOPHILS NFR BLD AUTO: 0.1 %
DEPRECATED RDW RBC AUTO: 46.2 FL (ref 35–45)
EOSINOPHIL NFR BLD AUTO: 0 %
EOSINOPHILS ABSOLUTE: 0 THOU/MM3 (ref 0–0.4)
ERYTHROCYTE [DISTWIDTH] IN BLOOD BY AUTOMATED COUNT: 13.4 % (ref 11.5–14.5)
GLUCOSE BLD STRIP.AUTO-MCNC: 234 MG/DL (ref 70–108)
GLUCOSE BLD STRIP.AUTO-MCNC: 277 MG/DL (ref 70–108)
GLUCOSE BLD STRIP.AUTO-MCNC: 293 MG/DL (ref 70–108)
GLUCOSE BLD STRIP.AUTO-MCNC: 335 MG/DL (ref 70–108)
HCT VFR BLD AUTO: 24 % (ref 37–47)
HGB BLD-MCNC: 7.7 GM/DL (ref 12–16)
IMM GRANULOCYTES # BLD AUTO: 0.06 THOU/MM3 (ref 0–0.07)
IMM GRANULOCYTES NFR BLD AUTO: 0.5 %
LYMPHOCYTES ABSOLUTE: 1.5 THOU/MM3 (ref 1–4.8)
LYMPHOCYTES NFR BLD AUTO: 12.4 %
MCH RBC QN AUTO: 30.2 PG (ref 26–33)
MCHC RBC AUTO-ENTMCNC: 32.1 GM/DL (ref 32.2–35.5)
MCV RBC AUTO: 94.1 FL (ref 81–99)
MONOCYTES ABSOLUTE: 1 THOU/MM3 (ref 0.4–1.3)
MONOCYTES NFR BLD AUTO: 8.3 %
NEUTROPHILS ABSOLUTE: 9.3 THOU/MM3 (ref 1.8–7.7)
NEUTROPHILS NFR BLD AUTO: 78.7 %
NRBC BLD AUTO-RTO: 0 /100 WBC
PLATELET # BLD AUTO: 194 THOU/MM3 (ref 130–400)
PMV BLD AUTO: 9.5 FL (ref 9.4–12.4)
RBC # BLD AUTO: 2.55 MILL/MM3 (ref 4.2–5.4)
WBC # BLD AUTO: 11.8 THOU/MM3 (ref 4.8–10.8)

## 2024-05-02 PROCEDURE — 97166 OT EVAL MOD COMPLEX 45 MIN: CPT

## 2024-05-02 PROCEDURE — 6360000002 HC RX W HCPCS: Performed by: NURSE PRACTITIONER

## 2024-05-02 PROCEDURE — G0378 HOSPITAL OBSERVATION PER HR: HCPCS

## 2024-05-02 PROCEDURE — 2580000003 HC RX 258: Performed by: NURSE PRACTITIONER

## 2024-05-02 PROCEDURE — 51798 US URINE CAPACITY MEASURE: CPT

## 2024-05-02 PROCEDURE — 6370000000 HC RX 637 (ALT 250 FOR IP): Performed by: NURSE PRACTITIONER

## 2024-05-02 PROCEDURE — 97530 THERAPEUTIC ACTIVITIES: CPT

## 2024-05-02 PROCEDURE — 85025 COMPLETE CBC W/AUTO DIFF WBC: CPT

## 2024-05-02 PROCEDURE — 97116 GAIT TRAINING THERAPY: CPT

## 2024-05-02 PROCEDURE — 97163 PT EVAL HIGH COMPLEX 45 MIN: CPT

## 2024-05-02 PROCEDURE — 97535 SELF CARE MNGMENT TRAINING: CPT

## 2024-05-02 PROCEDURE — 82948 REAGENT STRIP/BLOOD GLUCOSE: CPT

## 2024-05-02 PROCEDURE — 36415 COLL VENOUS BLD VENIPUNCTURE: CPT

## 2024-05-02 RX ORDER — FERROUS SULFATE 325(65) MG
325 TABLET ORAL 2 TIMES DAILY WITH MEALS
Status: DISCONTINUED | OUTPATIENT
Start: 2024-05-02 | End: 2024-05-07 | Stop reason: HOSPADM

## 2024-05-02 RX ADMIN — SENNOSIDES 8.6 MG: 8.6 TABLET, FILM COATED ORAL at 19:56

## 2024-05-02 RX ADMIN — BETHANECHOL CHLORIDE 25 MG: 25 TABLET ORAL at 08:32

## 2024-05-02 RX ADMIN — INSULIN LISPRO 4 UNITS: 100 INJECTION, SOLUTION INTRAVENOUS; SUBCUTANEOUS at 12:21

## 2024-05-02 RX ADMIN — OXYCODONE HYDROCHLORIDE AND ACETAMINOPHEN 2 TABLET: 5; 325 TABLET ORAL at 04:19

## 2024-05-02 RX ADMIN — INSULIN LISPRO 4 UNITS: 100 INJECTION, SOLUTION INTRAVENOUS; SUBCUTANEOUS at 17:15

## 2024-05-02 RX ADMIN — WATER 2000 MG: 1 INJECTION INTRAMUSCULAR; INTRAVENOUS; SUBCUTANEOUS at 04:20

## 2024-05-02 RX ADMIN — METFORMIN HYDROCHLORIDE 1000 MG: 500 TABLET ORAL at 08:32

## 2024-05-02 RX ADMIN — DOCUSATE SODIUM 100 MG: 100 CAPSULE, LIQUID FILLED ORAL at 08:33

## 2024-05-02 RX ADMIN — SODIUM CHLORIDE, PRESERVATIVE FREE 10 ML: 5 INJECTION INTRAVENOUS at 19:56

## 2024-05-02 RX ADMIN — OXYCODONE HYDROCHLORIDE AND ACETAMINOPHEN 1 TABLET: 5; 325 TABLET ORAL at 09:36

## 2024-05-02 RX ADMIN — DOCUSATE SODIUM 100 MG: 100 CAPSULE, LIQUID FILLED ORAL at 19:56

## 2024-05-02 RX ADMIN — METHENAMINE HIPPURATE 1 G: 1 TABLET ORAL at 17:15

## 2024-05-02 RX ADMIN — INSULIN LISPRO 2 UNITS: 100 INJECTION, SOLUTION INTRAVENOUS; SUBCUTANEOUS at 08:28

## 2024-05-02 RX ADMIN — FERROUS SULFATE TAB 325 MG (65 MG ELEMENTAL FE) 325 MG: 325 (65 FE) TAB at 17:15

## 2024-05-02 RX ADMIN — METFORMIN HYDROCHLORIDE 1000 MG: 500 TABLET ORAL at 17:15

## 2024-05-02 RX ADMIN — OXYCODONE HYDROCHLORIDE AND ACETAMINOPHEN 2 TABLET: 5; 325 TABLET ORAL at 17:15

## 2024-05-02 RX ADMIN — CYCLOBENZAPRINE 10 MG: 10 TABLET, FILM COATED ORAL at 12:55

## 2024-05-02 RX ADMIN — TAMSULOSIN HYDROCHLORIDE 0.4 MG: 0.4 CAPSULE ORAL at 08:32

## 2024-05-02 RX ADMIN — FERROUS SULFATE TAB 325 MG (65 MG ELEMENTAL FE) 325 MG: 325 (65 FE) TAB at 08:32

## 2024-05-02 RX ADMIN — INSULIN LISPRO 4 UNITS: 100 INJECTION, SOLUTION INTRAVENOUS; SUBCUTANEOUS at 19:57

## 2024-05-02 RX ADMIN — BETHANECHOL CHLORIDE 25 MG: 25 TABLET ORAL at 12:56

## 2024-05-02 RX ADMIN — METHENAMINE HIPPURATE 1 G: 1 TABLET ORAL at 08:31

## 2024-05-02 RX ADMIN — BETHANECHOL CHLORIDE 25 MG: 25 TABLET ORAL at 19:56

## 2024-05-02 ASSESSMENT — PAIN DESCRIPTION - LOCATION
LOCATION: BACK

## 2024-05-02 ASSESSMENT — PAIN DESCRIPTION - ORIENTATION
ORIENTATION: LOWER

## 2024-05-02 ASSESSMENT — PAIN SCALES - GENERAL
PAINLEVEL_OUTOF10: 3
PAINLEVEL_OUTOF10: 8
PAINLEVEL_OUTOF10: 0
PAINLEVEL_OUTOF10: 8

## 2024-05-02 ASSESSMENT — PAIN DESCRIPTION - PAIN TYPE
TYPE: SURGICAL PAIN
TYPE: SURGICAL PAIN

## 2024-05-02 ASSESSMENT — PAIN DESCRIPTION - ONSET: ONSET: ON-GOING

## 2024-05-02 ASSESSMENT — PAIN DESCRIPTION - DESCRIPTORS
DESCRIPTORS: ACHING

## 2024-05-02 ASSESSMENT — PAIN DESCRIPTION - FREQUENCY: FREQUENCY: CONTINUOUS

## 2024-05-02 NOTE — PROGRESS NOTES
Department of Orthopedic Surgery  Progress Note        Subjective:    5/2/24  Tawny complains of incision pain with noted improvement of her pre-op radicular symptoms. She is on 1L of O2 nC, denies any home O2. Patient is edematous will stop IV fluids, remove chavarria cath and sh eis ready to be up with PT/OT.      Vitals  VITALS:  BP (!) 103/53   Pulse 66   Temp 98 °F (36.7 °C) (Oral)   Resp 14   Ht 1.626 m (5' 4\")   Wt 99.3 kg (219 lb)   SpO2 99%   BMI 37.59 kg/m²   24HR INTAKE/OUTPUT:    Intake/Output Summary (Last 24 hours) at 5/2/2024 0704  Last data filed at 5/2/2024 0415  Gross per 24 hour   Intake 3909 ml   Output 2420 ml   Net 1489 ml     URINARY CATHETER OUTPUT (Chavarria):  Urinary Catheter 05/01/24 Chavarria;2 Way-Output (mL): 100 mL  DRAIN/TUBE OUTPUT:  Closed/Suction Drain Inferior;Lateral;Left Back Accordion-Output (ml): 40 ml  Closed/Suction Drain Inferior;Lateral;Right Back Accordion-Output (ml): 60 ml      PHYSICAL EXAM:    Orientation:  alert and oriented to person, place and time    Incision:  dressing in place, clean, dry, intact    ULower Extremity Motor :  quadriceps, extensor hallucis longus, dorsiflexion, plantarflexion 5/5 bilaterally  Lower Extremity Sensory:  Intact L1-S1    Flatus:  negative    ABNORMAL EXAM FINDINGS:  none    LABS:    HgB:    Lab Results   Component Value Date/Time    HGB 7.7 05/02/2024 06:21 AM     CBC with Differential:    Lab Results   Component Value Date/Time    WBC 11.8 05/02/2024 06:21 AM    RBC 2.55 05/02/2024 06:21 AM    HGB 7.7 05/02/2024 06:21 AM    HCT 24.0 05/02/2024 06:21 AM     05/02/2024 06:21 AM    MCV 94.1 05/02/2024 06:21 AM    MCH 30.2 05/02/2024 06:21 AM    MCHC 32.1 05/02/2024 06:21 AM    NRBC 0 05/02/2024 06:21 AM    MONOPCT 8.3 05/02/2024 06:21 AM    MONOSABS 1.0 05/02/2024 06:21 AM    EOSABS 0.0 05/02/2024 06:21 AM    BASOSABS 0.0 05/02/2024 06:21 AM       ASSESSMENT AND PLAN:    Post operative day 1     1:  Monitor labs and drain output  2:

## 2024-05-02 NOTE — PROGRESS NOTES
[facility]  INPATIENT PHYSICAL THERAPY  EVALUATION  [facility] OR[facility] 7K - 7K-16/016-A    Time In: 1035  Time Out: 1107  Timed Code Treatment Minutes: 23 Minutes  Minutes: 32          Date: 2024  Patient Name: Tawny Rico,  Gender:  female        MRN: [id number]  : 1955  (69 y.o.)      Referring Practitioner: SUKH Matthews CNP  Diagnosis: spinal stenosis of lumbar region  Additional Pertinent Hx: The patient is 69 years of age and struggling with her symptoms of back and leg pain due to spinal stenosis.  She has adjacent level stenosis of L2-L3, L3-L4, and L5-S1 adjacent to a previous L4-L5 decompression and fusion performed in year , 16 years prior.  The patient has significant difficulty standing and walking. Pt underwent Removal of Hardware Lumbar 4-Lumbar 5, Lumbar 5 to Sacral 1 Posterior Latereal Interbody Fusion, Lumbar 2 to Lumbar 4 Laminectomy and Posterior Spinal Fusion with Local Bone Graft, Demineralized Bone Matrix and Instrumentation on  with Dr. Ortiz.     Restrictions/Precautions:  Restrictions/Precautions: Fall Risk, Up as Tolerated  Required Braces or Orthoses  Spinal: Lumbar Corset  Spinal Other: per ortho spine PA, okay for abdominal binder until lumbar corset arrives  Position Activity Restriction  Spinal Precautions: No Bending, No Lifting, No Twisting    Subjective:  Chart Reviewed: Yes  Patient assessed for rehabilitation services?: Yes  Subjective: cooperative, LB&L rep entered to fit lumbar brace    General:        Hearing: Within functional limits       Pain: 8/10: back per pt    Vitals:  after long bout of amb pt c/o lightheadedness, pt sat in visitor chair and RN in to check BP-110/52 and per RN that is improved from earlier today.    Social/Functional History:    Lives With: Alone  Type of Home: Apartment (new senior living apartment)  Home Layout: One level  Home Access: Level entry  Home Equipment: Walker, rolling                   Ambulation Assistance:

## 2024-05-02 NOTE — CARE COORDINATION
Case Management Assessment Initial Evaluation    Date/Time of Evaluation: 5/2/2024 8:26 AM  Assessment Completed by: Cami Riggins RN    If patient is discharged prior to next notation, then this note serves as note for discharge by case management.    Patient Name: Tawny Rico                   YOB: 1955  Diagnosis: Spinal stenosis of lumbar region, unspecified whether neurogenic claudication present [M48.061]  Spinal stenosis of lumbar region with neurogenic claudication [M48.062]                   Date / Time: 5/1/2024  5:27 AM  Location: [address] [place] [address]     Patient Admission Status: Observation   Readmission Risk Low 0-14, Mod 15-19), High > 20: No data recorded  Current PCP: Gregg Horne MD    Additional Case Management Notes: planned surgery with Dr Ortiz    POD 1, needs back brace ordered, remove chavarria, Hgb 7.7, WBC 11.8, on Urecholine, begin therapy, pain control, monitor bilat lumbar drain outputs. Ileus prevention protocol. B/P soft 97/43.    Procedures: 5/1 Removal of HW 4-Lumbar 5, Lumbar 5 to Sacral 1 Posterior Latereal Interbody Fusion, Lumbar 2 to Lumbar 4 Laminectomy and Posterior Spinal Fusion with Local Bone Graft, Demineralized Bone Matrix and Instrumentation with EBL 1000 mL with return of Cell Saver of 385 mL.   Imaging: na    Patient Goals/Plan/Treatment Preferences: Spoke with Tawny earlier; she lives home alone in [facility] apartment in [facility]. She has needed DME (Back brace/walker), is able to afford medicines, and requested SNF for rehab and pt will be precert. SW consulted.           05/02/24 1116    Service Assessment   Patient Orientation Alert and Oriented   Cognition Alert   History Provided By Patient   Primary Caregiver Self   Accompanied By/Relationship unaccompanied   Support Systems Children   Patient's Healthcare Decision Maker is: Named in Scanned ACP Document   PCP Verified by CM Yes   Last Visit to PCP Within last 3 months   Prior Functional

## 2024-05-02 NOTE — CARE COORDINATION
DISCHARGE PLANNING EVALUATION  5/2/24, 12:22 PM EDT    Reason for Referral: snf  Decision Maker: makes own decisions, does not have POA  Current Services: none   New Services Requested:  snf, requests [facility]  Family/ Social/ Home environment: Tawny lives at home alone, does not have support at home to assist with her care.  She plans snf for rehab   Payment Source: [employer] medicare   Transportation at Discharge: undetermined  Post-acute (PAC) provider list was provided to patient. Patient was informed of their freedom to choose PAC provider. Discussed and offered to show the patient the relevant PAC Providers quality and resource use measures on Medicare Compare web site via computer based on patient's goals of care and treatment preferences. Questions regarding selection process were answered.  List provided and in network provider list also given and discussed.  Patient will only accept [facility], no second choice    Teach Back Method used with patient regarding care plan and discharge plan  Patient verbalized understanding of the plan of care and contribute to goal setting.       Patient preferences and discharge plan:  spoke with Tawny about discharge plan.  She is requesting [facility] snf, referral made but no bed available until possibly next Monday or Tuesday.  Discussed with patient, she does not have a second choice that she would consider and does not feel that she can go home alone.  She states she does not have an alternative plan to [facility].  Referral made to [facility], in case bed becomes availabe, but encouraged patient to consider options in case an alternative plan is needed.     Electronically signed by JAMES Grace on 5/2/2024 at 12:22 PM

## 2024-05-02 NOTE — PLAN OF CARE
Problem: Discharge Planning  Goal: Discharge to home or other facility with appropriate resources  5/2/2024 1213 by Linda Soto RN  Outcome: Progressing  Flowsheets (Taken 5/2/2024 1213)  Discharge to home or other facility with appropriate resources:   Identify discharge learning needs (meds, wound care, etc)   Arrange for needed discharge resources and transportation as appropriate     Problem: Pain  Goal: Verbalizes/displays adequate comfort level or baseline comfort level  5/2/2024 1213 by Linda Soto RN  Outcome: Progressing  Flowsheets (Taken 5/2/2024 1213)  Verbalizes/displays adequate comfort level or baseline comfort level:   Administer analgesics based on type and severity of pain and evaluate response   Assess pain using appropriate pain scale     Problem: Safety - Adult  Goal: Free from fall injury  5/2/2024 1213 by Linda Soto RN  Outcome: Progressing  Flowsheets (Taken 5/2/2024 1213)  Free From Fall Injury: Instruct family/caregiver on patient safety     Problem: Musculoskeletal - Adult  Goal: Return mobility to safest level of function  5/2/2024 1213 by Linda Soto RN  Outcome: Progressing  Flowsheets (Taken 5/2/2024 1213)  Return Mobility to Safest Level of Function:   Ensure adequate protection for wounds/incisions during mobilization   Assist with transfers and ambulation using safe patient handling equipment as needed

## 2024-05-02 NOTE — PLAN OF CARE
Problem: Discharge Planning  Goal: Discharge to home or other facility with appropriate resources  Outcome: Progressing  Flowsheets (Taken 5/2/2024 0155)  Discharge to home or other facility with appropriate resources: Identify barriers to discharge with patient and caregiver     Problem: Chronic Conditions and Co-morbidities  Goal: Patient's chronic conditions and co-morbidity symptoms are monitored and maintained or improved  Outcome: Progressing  Flowsheets (Taken 5/2/2024 0155)  Care Plan - Patient's Chronic Conditions and Co-Morbidity Symptoms are Monitored and Maintained or Improved: Monitor and assess patient's chronic conditions and comorbid symptoms for stability, deterioration, or improvement     Problem: Pain  Goal: Verbalizes/displays adequate comfort level or baseline comfort level  Outcome: Progressing  Flowsheets (Taken 5/2/2024 0155)  Verbalizes/displays adequate comfort level or baseline comfort level:   Encourage patient to monitor pain and request assistance   Assess pain using appropriate pain scale   Administer analgesics based on type and severity of pain and evaluate response   Implement non-pharmacological measures as appropriate and evaluate response     Problem: Safety - Adult  Goal: Free from fall injury  Outcome: Progressing  Flowsheets (Taken 5/2/2024 0155)  Free From Fall Injury: Instruct family/caregiver on patient safety     Problem: Skin/Tissue Integrity - Adult  Goal: Skin integrity remains intact  Outcome: Progressing  Flowsheets (Taken 5/2/2024 0155)  Skin Integrity Remains Intact: Monitor for areas of redness and/or skin breakdown  Goal: Incisions, wounds, or drain sites healing without S/S of infection  Outcome: Progressing  Flowsheets (Taken 5/2/2024 0155)  Incisions, Wounds, or Drain Sites Healing Without Sign and Symptoms of Infection:   ADMISSION and DAILY: Assess and document risk factors for pressure ulcer development   TWICE DAILY: Assess and document skin integrity      Problem: Musculoskeletal - Adult  Goal: Return mobility to safest level of function  Outcome: Progressing  Flowsheets (Taken 5/2/2024 0155)  Return Mobility to Safest Level of Function:   Assess patient stability and activity tolerance for standing, transferring and ambulating with or without assistive devices   Assist with transfers and ambulation using safe patient handling equipment as needed   Ensure adequate protection for wounds/incisions during mobilization   Obtain physical therapy/occupational therapy consults as needed   Instruct patient/family in ordered activity level     Problem: Genitourinary - Adult  Goal: Absence of urinary retention  Outcome: Progressing  Flowsheets (Taken 5/2/2024 0155)  Absence of urinary retention:   Monitor intake/output and perform bladder scan as needed   Assess patient’s ability to void and empty bladder  Goal: Urinary catheter remains patent  Outcome: Progressing  Flowsheets (Taken 5/2/2024 0155)  Urinary catheter remains patent: Assess patency of urinary catheter     Problem: Hematologic - Adult  Goal: Maintains hematologic stability  Outcome: Progressing  Flowsheets (Taken 5/2/2024 0155)  Maintains hematologic stability:   Monitor labs for bleeding or clotting disorders   Administer blood products/factors as ordered   Assess for signs and symptoms of bleeding or hemorrhage     Care plan reviewed with patient.  Patient verbalizes understanding of the plan of care and contribute to goal setting.

## 2024-05-02 NOTE — PROGRESS NOTES
[facility]  INPATIENT OCCUPATIONAL THERAPY  [facility] ORTHOPEDICS 7K  EVALUATION    Time:   Time In: 921  Time Out: 953  Timed Code Treatment Minutes: 23 Minutes  Minutes: 32          Date: 2024  Patient Name: Tawny Rico,   Gender: female      MRN: [id number]  : 1955  (69 y.o.)  Referring Practitioner: Gisel Matthews, WILLIS - CNP  Diagnosis: spinal stenosis of lumbar region , unspecified weather neurogenic claudication present  Additional Pertinent Hx: The patient is 69 years of age and struggling with her symptoms of back and leg pain due to spinal stenosis.  She has adjacent level stenosis of L2-L3, L3-L4, and L5-S1 adjacent to a previous L4-L5 decompression and fusion performed in year , 16 years prior.  The patient has significant difficulty standing and walking. Pt underwent Removal of Hardware Lumbar 4-Lumbar 5, Lumbar 5 to Sacral 1 Posterior Latereal Interbody Fusion, Lumbar 2 to Lumbar 4 Laminectomy and Posterior Spinal Fusion with Local Bone Graft, Demineralized Bone Matrix and Instrumentation on  with Dr. Ortiz.    Restrictions/Precautions:  Restrictions/Precautions: Fall Risk, Up as Tolerated  Required Braces or Orthoses  Spinal: Lumbar Corset  Spinal Other: per ortho spine PA, okay for abdominal binder until lumbar corset arrives  Position Activity Restriction  Spinal Precautions: No Bending, No Lifting, No Twisting    Subjective  Chart Reviewed: Yes, Orders, Progress Notes, History and Physical  Patient assessed for rehabilitation services?: Yes    Subjective: Nurse approved OT eval. Pt in bed on OT arrival, initially apprehensive about getting up before her chavarria was out but is agreeablw to encouragement from nurse.    Pain: 8/10: nurse present to provide medication     Vitals: Blood Pressure: sitting EOB: 133/61; after transfer to recliner chair: 108/54    Social/Functional History:  Lives With: Alone  Type of Home: Apartment (new senior living apartment)  Home

## 2024-05-02 NOTE — OP NOTE
physician, Dr. Horne before surgery for medical clearance.  I have also discussed the risks of this operation that would include, but not limited to, infection, anesthesia risk, nerve injury, CSF leak, postop pain, bleeding, need for blood transfusion, need for future adjacent level spinal surgery or surgical treatment of the same levels should infection, fracture of hardware, loosening of hardware develop.  All questions were answered before proceeding and she choose this date to proceed.    DESCRIPTION OF PROCEDURE:  We brought the patient to the operating room, and upon entry, a time-out was observed.  Anesthetic was delivered.  Airway secured and Disla catheter placed and she was turned prone to a Vinayak frame table for appropriate padding in a prone position for thorough prepping and draping of the lumbar spine with use of a soap scrub solution, sterile towel, and a ChloraPrep solution.  She received 2 g of IV Ancef at the completion and also 1 g of intravenous TXA, and a formal time-out will be observed.  The skin was marked midline over the lumbar spine after prepping and draping with use of a soap scrub solution, sterile towel, and ChloraPrep solution.  I marked the skin levels of L2 through S1 in the midline and injected 10 mL of a 1% lidocaine with epinephrine solution.    After thorough prepping and draping, we then made skin incision over L2 through S1 levels and exposed the lumbar spine through a subperiosteal approach to the midline.  This allowed the exposure as well as of the L4-5 previously placed hardware and screws placed in the pedicles.  Backed out each of these 2 set screw caps and bridging rods and the pedicle screws at this L4 and L5 level bilateral.  In doing so, we were able to then assess the fusion of L4-L5 through the scoring and manipulation technique and find it very subtle opening, well fused level of L4-5 with very good bone graft incorporation and no motion with manipulation.  I  drains and backed out the retractors and closed in layers over the drains and sewed these in.  We finished the layered closure by dressing, then turned the patient back to a supine position for her awakening and extubation.  She has done very well throughout the operation.  She has been stable throughout the operation.    My first assistant, Gisel Horvath, CNP, FAC, throughout the operation without complication.    Due to the BMI of 37.59, the patient had an increase of operative time over 50% due to the body habitus.          DAREN COTTON MD      D:  05/01/2024 12:47:09     T:  05/01/2024 19:43:58     [facility]  Job #:  [id number]     Doc#:  [id number]

## 2024-05-03 PROBLEM — N39.0 RECURRENT UTI: Status: ACTIVE | Noted: 2024-05-03

## 2024-05-03 PROBLEM — E11.65 CONTROLLED TYPE 2 DIABETES MELLITUS WITH HYPERGLYCEMIA, WITHOUT LONG-TERM CURRENT USE OF INSULIN (HCC): Status: ACTIVE | Noted: 2024-05-03

## 2024-05-03 PROBLEM — I10 PRIMARY HYPERTENSION: Status: ACTIVE | Noted: 2024-05-03

## 2024-05-03 PROBLEM — R42 LIGHTHEADED: Status: ACTIVE | Noted: 2024-05-03

## 2024-05-03 PROBLEM — K59.00 CONSTIPATION: Status: ACTIVE | Noted: 2024-05-03

## 2024-05-03 LAB
ANION GAP SERPL CALC-SCNC: 11 MEQ/L (ref 8–16)
BASOPHILS ABSOLUTE: 0 THOU/MM3 (ref 0–0.1)
BASOPHILS NFR BLD AUTO: 0.2 %
BUN SERPL-MCNC: 23 MG/DL (ref 7–22)
CALCIUM SERPL-MCNC: 9.3 MG/DL (ref 8.5–10.5)
CHLORIDE SERPL-SCNC: 99 MEQ/L (ref 98–111)
CO2 SERPL-SCNC: 21 MEQ/L (ref 23–33)
CREAT SERPL-MCNC: 0.9 MG/DL (ref 0.4–1.2)
DEPRECATED MEAN GLUCOSE BLD GHB EST-ACNC: 156 MG/DL (ref 70–126)
DEPRECATED RDW RBC AUTO: 45.3 FL (ref 35–45)
EOSINOPHIL NFR BLD AUTO: 0.3 %
EOSINOPHILS ABSOLUTE: 0 THOU/MM3 (ref 0–0.4)
ERYTHROCYTE [DISTWIDTH] IN BLOOD BY AUTOMATED COUNT: 13.4 % (ref 11.5–14.5)
GFR SERPL CREATININE-BSD FRML MDRD: 69 ML/MIN/1.73M2
GLUCOSE BLD STRIP.AUTO-MCNC: 224 MG/DL (ref 70–108)
GLUCOSE BLD STRIP.AUTO-MCNC: 293 MG/DL (ref 70–108)
GLUCOSE BLD STRIP.AUTO-MCNC: 302 MG/DL (ref 70–108)
GLUCOSE BLD STRIP.AUTO-MCNC: 340 MG/DL (ref 70–108)
GLUCOSE BLD STRIP.AUTO-MCNC: 352 MG/DL (ref 70–108)
GLUCOSE SERPL-MCNC: 320 MG/DL (ref 70–108)
HBA1C MFR BLD HPLC: 7.2 % (ref 4.4–6.4)
HCT VFR BLD AUTO: 24.8 % (ref 37–47)
HGB BLD-MCNC: 8.1 GM/DL (ref 12–16)
IMM GRANULOCYTES # BLD AUTO: 0.05 THOU/MM3 (ref 0–0.07)
IMM GRANULOCYTES NFR BLD AUTO: 0.6 %
LYMPHOCYTES ABSOLUTE: 1.4 THOU/MM3 (ref 1–4.8)
LYMPHOCYTES NFR BLD AUTO: 15.7 %
MCH RBC QN AUTO: 30.1 PG (ref 26–33)
MCHC RBC AUTO-ENTMCNC: 32.7 GM/DL (ref 32.2–35.5)
MCV RBC AUTO: 92.2 FL (ref 81–99)
MONOCYTES ABSOLUTE: 0.7 THOU/MM3 (ref 0.4–1.3)
MONOCYTES NFR BLD AUTO: 8.1 %
NEUTROPHILS ABSOLUTE: 6.6 THOU/MM3 (ref 1.8–7.7)
NEUTROPHILS NFR BLD AUTO: 75.1 %
NRBC BLD AUTO-RTO: 0 /100 WBC
PLATELET # BLD AUTO: 160 THOU/MM3 (ref 130–400)
PMV BLD AUTO: 9.8 FL (ref 9.4–12.4)
POTASSIUM SERPL-SCNC: 4.4 MEQ/L (ref 3.5–5.2)
RBC # BLD AUTO: 2.69 MILL/MM3 (ref 4.2–5.4)
SODIUM SERPL-SCNC: 131 MEQ/L (ref 135–145)
WBC # BLD AUTO: 8.8 THOU/MM3 (ref 4.8–10.8)

## 2024-05-03 PROCEDURE — 80048 BASIC METABOLIC PNL TOTAL CA: CPT

## 2024-05-03 PROCEDURE — 97530 THERAPEUTIC ACTIVITIES: CPT

## 2024-05-03 PROCEDURE — 97535 SELF CARE MNGMENT TRAINING: CPT

## 2024-05-03 PROCEDURE — 99222 1ST HOSP IP/OBS MODERATE 55: CPT | Performed by: STUDENT IN AN ORGANIZED HEALTH CARE EDUCATION/TRAINING PROGRAM

## 2024-05-03 PROCEDURE — 97116 GAIT TRAINING THERAPY: CPT

## 2024-05-03 PROCEDURE — 6370000000 HC RX 637 (ALT 250 FOR IP): Performed by: NURSE PRACTITIONER

## 2024-05-03 PROCEDURE — 36415 COLL VENOUS BLD VENIPUNCTURE: CPT

## 2024-05-03 PROCEDURE — 85025 COMPLETE CBC W/AUTO DIFF WBC: CPT

## 2024-05-03 PROCEDURE — G0378 HOSPITAL OBSERVATION PER HR: HCPCS

## 2024-05-03 PROCEDURE — 2580000003 HC RX 258: Performed by: NURSE PRACTITIONER

## 2024-05-03 PROCEDURE — 6370000000 HC RX 637 (ALT 250 FOR IP): Performed by: STUDENT IN AN ORGANIZED HEALTH CARE EDUCATION/TRAINING PROGRAM

## 2024-05-03 PROCEDURE — 82948 REAGENT STRIP/BLOOD GLUCOSE: CPT

## 2024-05-03 PROCEDURE — 83036 HEMOGLOBIN GLYCOSYLATED A1C: CPT

## 2024-05-03 RX ORDER — GLIPIZIDE 5 MG/1
5 TABLET ORAL
Status: DISCONTINUED | OUTPATIENT
Start: 2024-05-03 | End: 2024-05-03

## 2024-05-03 RX ORDER — TAMSULOSIN HYDROCHLORIDE 0.4 MG/1
0.4 CAPSULE ORAL 2 TIMES DAILY
Status: DISCONTINUED | OUTPATIENT
Start: 2024-05-03 | End: 2024-05-07 | Stop reason: HOSPADM

## 2024-05-03 RX ORDER — BUMETANIDE 0.5 MG/1
0.5 TABLET ORAL DAILY
Status: DISCONTINUED | OUTPATIENT
Start: 2024-05-03 | End: 2024-05-07 | Stop reason: HOSPADM

## 2024-05-03 RX ORDER — ALOGLIPTIN 6.25 MG/1
6.25 TABLET, FILM COATED ORAL DAILY
Status: DISCONTINUED | OUTPATIENT
Start: 2024-05-03 | End: 2024-05-07 | Stop reason: HOSPADM

## 2024-05-03 RX ORDER — GLIPIZIDE 10 MG/1
10 TABLET ORAL
Status: DISCONTINUED | OUTPATIENT
Start: 2024-05-03 | End: 2024-05-07 | Stop reason: HOSPADM

## 2024-05-03 RX ORDER — INSULIN LISPRO 100 [IU]/ML
0-4 INJECTION, SOLUTION INTRAVENOUS; SUBCUTANEOUS NIGHTLY
Status: DISCONTINUED | OUTPATIENT
Start: 2024-05-03 | End: 2024-05-07 | Stop reason: HOSPADM

## 2024-05-03 RX ORDER — BISACODYL 5 MG/1
5 TABLET, DELAYED RELEASE ORAL ONCE
Status: COMPLETED | OUTPATIENT
Start: 2024-05-03 | End: 2024-05-03

## 2024-05-03 RX ORDER — INSULIN LISPRO 100 [IU]/ML
0-16 INJECTION, SOLUTION INTRAVENOUS; SUBCUTANEOUS
Status: DISCONTINUED | OUTPATIENT
Start: 2024-05-03 | End: 2024-05-07 | Stop reason: HOSPADM

## 2024-05-03 RX ORDER — INSULIN LISPRO 100 [IU]/ML
5 INJECTION, SOLUTION INTRAVENOUS; SUBCUTANEOUS ONCE
Status: COMPLETED | OUTPATIENT
Start: 2024-05-03 | End: 2024-05-03

## 2024-05-03 RX ORDER — CETIRIZINE HYDROCHLORIDE 10 MG/1
10 TABLET ORAL DAILY PRN
Status: DISCONTINUED | OUTPATIENT
Start: 2024-05-03 | End: 2024-05-07 | Stop reason: HOSPADM

## 2024-05-03 RX ORDER — SENNOSIDES A AND B 8.6 MG/1
2 TABLET, FILM COATED ORAL NIGHTLY
Status: DISCONTINUED | OUTPATIENT
Start: 2024-05-03 | End: 2024-05-07 | Stop reason: HOSPADM

## 2024-05-03 RX ADMIN — ATENOLOL 50 MG: 50 TABLET ORAL at 09:36

## 2024-05-03 RX ADMIN — SODIUM CHLORIDE, PRESERVATIVE FREE 10 ML: 5 INJECTION INTRAVENOUS at 20:21

## 2024-05-03 RX ADMIN — ALOGLIPTIN 6.25 MG: 6.25 TABLET, FILM COATED ORAL at 13:58

## 2024-05-03 RX ADMIN — FERROUS SULFATE TAB 325 MG (65 MG ELEMENTAL FE) 325 MG: 325 (65 FE) TAB at 15:12

## 2024-05-03 RX ADMIN — POLYETHYLENE GLYCOL 3350 17 G: 17 POWDER, FOR SOLUTION ORAL at 09:49

## 2024-05-03 RX ADMIN — GLIPIZIDE 10 MG: 10 TABLET ORAL at 15:12

## 2024-05-03 RX ADMIN — DOCUSATE SODIUM 100 MG: 100 CAPSULE, LIQUID FILLED ORAL at 20:21

## 2024-05-03 RX ADMIN — METFORMIN HYDROCHLORIDE 1000 MG: 500 TABLET ORAL at 09:35

## 2024-05-03 RX ADMIN — BETHANECHOL CHLORIDE 25 MG: 25 TABLET ORAL at 20:21

## 2024-05-03 RX ADMIN — METHENAMINE HIPPURATE 1 G: 1 TABLET ORAL at 09:35

## 2024-05-03 RX ADMIN — MAGNESIUM HYDROXIDE 30 ML: 1200 LIQUID ORAL at 13:58

## 2024-05-03 RX ADMIN — INSULIN LISPRO 6 UNITS: 100 INJECTION, SOLUTION INTRAVENOUS; SUBCUTANEOUS at 17:00

## 2024-05-03 RX ADMIN — METHENAMINE HIPPURATE 1 G: 1 TABLET ORAL at 17:01

## 2024-05-03 RX ADMIN — CYCLOBENZAPRINE 10 MG: 10 TABLET, FILM COATED ORAL at 08:41

## 2024-05-03 RX ADMIN — INSULIN LISPRO 4 UNITS: 100 INJECTION, SOLUTION INTRAVENOUS; SUBCUTANEOUS at 08:41

## 2024-05-03 RX ADMIN — TAMSULOSIN HYDROCHLORIDE 0.4 MG: 0.4 CAPSULE ORAL at 09:35

## 2024-05-03 RX ADMIN — INSULIN LISPRO 12 UNITS: 100 INJECTION, SOLUTION INTRAVENOUS; SUBCUTANEOUS at 18:56

## 2024-05-03 RX ADMIN — SENNOSIDES 17.2 MG: 8.6 TABLET, FILM COATED ORAL at 20:21

## 2024-05-03 RX ADMIN — LISINOPRIL 5 MG: 5 TABLET ORAL at 09:36

## 2024-05-03 RX ADMIN — SODIUM CHLORIDE, PRESERVATIVE FREE 10 ML: 5 INJECTION INTRAVENOUS at 09:36

## 2024-05-03 RX ADMIN — BETHANECHOL CHLORIDE 25 MG: 25 TABLET ORAL at 13:59

## 2024-05-03 RX ADMIN — OXYCODONE HYDROCHLORIDE AND ACETAMINOPHEN 1 TABLET: 5; 325 TABLET ORAL at 15:12

## 2024-05-03 RX ADMIN — INSULIN LISPRO 8 UNITS: 100 INJECTION, SOLUTION INTRAVENOUS; SUBCUTANEOUS at 12:07

## 2024-05-03 RX ADMIN — INSULIN LISPRO 5 UNITS: 100 INJECTION, SOLUTION INTRAVENOUS; SUBCUTANEOUS at 18:56

## 2024-05-03 RX ADMIN — BUMETANIDE 0.5 MG: 0.5 TABLET ORAL at 11:38

## 2024-05-03 RX ADMIN — BETHANECHOL CHLORIDE 25 MG: 25 TABLET ORAL at 09:35

## 2024-05-03 RX ADMIN — METFORMIN HYDROCHLORIDE 1000 MG: 500 TABLET ORAL at 17:01

## 2024-05-03 RX ADMIN — CYCLOBENZAPRINE 10 MG: 10 TABLET, FILM COATED ORAL at 01:57

## 2024-05-03 RX ADMIN — DOCUSATE SODIUM 100 MG: 100 CAPSULE, LIQUID FILLED ORAL at 08:41

## 2024-05-03 RX ADMIN — OXYCODONE HYDROCHLORIDE AND ACETAMINOPHEN 2 TABLET: 5; 325 TABLET ORAL at 01:57

## 2024-05-03 RX ADMIN — OXYCODONE HYDROCHLORIDE AND ACETAMINOPHEN 1 TABLET: 5; 325 TABLET ORAL at 08:41

## 2024-05-03 RX ADMIN — FERROUS SULFATE TAB 325 MG (65 MG ELEMENTAL FE) 325 MG: 325 (65 FE) TAB at 09:36

## 2024-05-03 RX ADMIN — BISACODYL 5 MG: 5 TABLET, COATED ORAL at 13:59

## 2024-05-03 RX ADMIN — TAMSULOSIN HYDROCHLORIDE 0.4 MG: 0.4 CAPSULE ORAL at 20:21

## 2024-05-03 ASSESSMENT — PAIN DESCRIPTION - PAIN TYPE: TYPE: SURGICAL PAIN

## 2024-05-03 ASSESSMENT — PAIN SCALES - GENERAL
PAINLEVEL_OUTOF10: 4
PAINLEVEL_OUTOF10: 8
PAINLEVEL_OUTOF10: 4
PAINLEVEL_OUTOF10: 4
PAINLEVEL_OUTOF10: 3

## 2024-05-03 ASSESSMENT — PAIN DESCRIPTION - ORIENTATION
ORIENTATION: MID
ORIENTATION: LOWER

## 2024-05-03 ASSESSMENT — PAIN DESCRIPTION - FREQUENCY: FREQUENCY: CONTINUOUS

## 2024-05-03 ASSESSMENT — PAIN DESCRIPTION - ONSET: ONSET: ON-GOING

## 2024-05-03 ASSESSMENT — PAIN - FUNCTIONAL ASSESSMENT
PAIN_FUNCTIONAL_ASSESSMENT: ACTIVITIES ARE NOT PREVENTED
PAIN_FUNCTIONAL_ASSESSMENT: PREVENTS OR INTERFERES SOME ACTIVE ACTIVITIES AND ADLS

## 2024-05-03 ASSESSMENT — PAIN DESCRIPTION - DESCRIPTORS
DESCRIPTORS: DISCOMFORT
DESCRIPTORS: SHOOTING;SHARP

## 2024-05-03 ASSESSMENT — PAIN DESCRIPTION - LOCATION
LOCATION: BACK
LOCATION: BACK

## 2024-05-03 NOTE — CARE COORDINATION
5/3/24, 11:53 AM EDT    DISCHARGE PLANNING EVALUATION    Spoke with [facility] admissions, who is unsure if they will have a bed next week or not.    Spoke with patient and daughter, who inquired about IPR.  Call made to [facility] admissions, IPR is declining.     Patient and daughter request referral to [facility].  Referral initiated.  Will need precert if accepting.

## 2024-05-03 NOTE — PLAN OF CARE
Problem: Discharge Planning  Goal: Discharge to home or other facility with appropriate resources  Outcome: Progressing  Flowsheets (Taken 5/3/2024 1037)  Discharge to home or other facility with appropriate resources: Identify barriers to discharge with patient and caregiver     Problem: Chronic Conditions and Co-morbidities  Goal: Patient's chronic conditions and co-morbidity symptoms are monitored and maintained or improved  Outcome: Progressing  Flowsheets (Taken 5/3/2024 1037)  Care Plan - Patient's Chronic Conditions and Co-Morbidity Symptoms are Monitored and Maintained or Improved: Monitor and assess patient's chronic conditions and comorbid symptoms for stability, deterioration, or improvement     Problem: Pain  Goal: Verbalizes/displays adequate comfort level or baseline comfort level  Outcome: Progressing  Flowsheets (Taken 5/3/2024 1037)  Verbalizes/displays adequate comfort level or baseline comfort level: Encourage patient to monitor pain and request assistance     Problem: Safety - Adult  Goal: Free from fall injury  Outcome: Progressing  Flowsheets (Taken 5/3/2024 1037)  Free From Fall Injury: Instruct family/caregiver on patient safety     Problem: Skin/Tissue Integrity - Adult  Goal: Skin integrity remains intact  Outcome: Progressing  Flowsheets (Taken 5/3/2024 1037)  Skin Integrity Remains Intact: Monitor for areas of redness and/or skin breakdown     Problem: Skin/Tissue Integrity - Adult  Goal: Incisions, wounds, or drain sites healing without S/S of infection  Outcome: Progressing  Flowsheets (Taken 5/3/2024 1037)  Incisions, Wounds, or Drain Sites Healing Without Sign and Symptoms of Infection:   TWICE DAILY: Assess and document skin integrity   TWICE DAILY: Assess and document dressing/incision, wound bed, drain sites and surrounding tissue     Problem: Musculoskeletal - Adult  Goal: Return mobility to safest level of function  Outcome: Progressing  Flowsheets (Taken 5/3/2024 1037)  Return

## 2024-05-03 NOTE — PLAN OF CARE
Problem: Safety - Adult  Goal: Isolation precautions  Description: Isolation precautions  Outcome: Progressing   Care plan reviewed with patient.  Patient verbalize understanding of the plan of care and contribute to goal setting.

## 2024-05-03 NOTE — PLAN OF CARE
Notified by RN, the patient is reporting blurry vision and chills.  Assessed patient at bedside, she reports chronic history of intermittent blurry vision, but it was worse today initially, but now improving, almost resolved.  Vital signs stable.  Glucose noted to be 340 despite of resuming her home medications.  Will give additional Humalog and switch to high-dose sliding scale insulin.  If continues to be elevated, may need basal coverage. NIHSS of 0 (no visual field deficit noted) on physical exam.  Consider CT head, CTA head and neck, MRI if she continues to report blurry vision.  Also, would benefit from outpatient follow-up with ophthalmology.    Checked on patient around 7:40 PM she had no further blurriness of her vision.

## 2024-05-03 NOTE — PROGRESS NOTES
daughter educated on each location with recommendation for discharge. Pt. And daughter reports first choice now would be IPR then SNF if not available.   Pt. Requires increased time due to anxiety.      PAIN: 7/10:low back    Vitals: Nurse checked vitals prior to session    COGNITION: Slow Processing, Decreased Insight, Decreased Problem Solving, and Decreased Safety Awareness, anxiety    ADL:   Grooming: Stand By Assistance, with set-up, with verbal cues , and with increased time for completion.  To sit on EOB and wash face and comb hair, daughter assisted Pt. With hair combing in the back due to tangling.   Bathing: with set-up, with verbal cues , and with increased time for completion.  To complete sponge bath SBA to wash UB and to the knees Pt. Able to wash anterior yvonne care with CGA and in creased time and dependent with posterior yvonne care.  CHG bath utilzed  Upper Extremity Dressing: Maximum Assistance and with verbal cues .  To don and doff corset .    IADL:   Not Tested    BALANCE:  Sitting Balance:  Stand By Assistance. Seated on EOB  Standing Balance: Contact Guard Assistance, with cues for safety. With support of RW    BED MOBILITY:  Supine to Sit: Minimal Assistance, X 1, with verbal cues , with increased time for completion using log roll technqiue with extensive time to complete and self calming techniques  Scooting: Minimal Assistance, Moderate Assistance, X 1 to scoot to EOB    TRANSFERS:  Sit to Stand:  Minimal Assistance, Moderate Assistance, X 1, with increased time for completion, cues for hand placement, with verbal cues. From EOB  Stand to Sit: Contact Guard Assistance, Minimal Assistance, X 1, with increased time for completion, cues for hand placement, with verbal cues.      FUNCTIONAL MOBILITY:  Assistive Device: Rolling Walker  Assist Level:  Contact Guard Assistance, with set-up, and with verbal cues .   Distance:  within room 1 lap very slow pace, small steps       ADDITIONAL  ACTIVITIES:  Precautions reviewed Pt. Able to recall 3/3.  LHAE reviewed Pt. Would benefit from further review.     AM-PAC Inpatient Daily Activity Raw Score: 14  AM-PAC Inpatient ADL T-Scale Score : 33.39  ADL Inpatient CMS 0-100% Score: 59.67    Modified [place] Scale:  Not Applicable    ASSESSMENT:     Activity Tolerance:  Patient tolerance of  treatment: Fair treatment tolerance limited by pain and anxiety      Discharge Recommendations: Inpatient Therapy Stay  Equipment Recommendations: Equipment Needed: No  Other: defer to next level of care  Plan: Times Per Week: 6x  Times Per Day: Once a day  Current Treatment Recommendations: Strengthening, Balance training, Functional mobility training, Endurance training, Safety education & training, Patient/Caregiver education & training, Self-Care / ADL, Equipment evaluation, education, & procurement    Education:  Learners: Patient and Family  ADL's, IADL's, Precautions, Family Education, Equipment Education, Home Safety, Importance of Increasing Activity, Fall Prevention, and Assistive Device Safety    Goals  Short Term Goals  Time Frame for Short Term Goals: until discharge  Short Term Goal 1: Pt will navigate HH distances with SBA using LRAD and with good awareness for obstacles and walker management to improve indep with ADL routines.  Short Term Goal 2: Pt will tolerate x5 min standing with 1-2 UE release at SBA to improve dynamic balance and activity tolerance required for sinkside I/ADLs.  Short Term Goal 3: Pt will perform UB/LB dressing/bathing with SBA using LHAE as needed to improve indep with ADL routines.  Short Term Goal 4: Pt will complete toileting routine including transfer with SBA to improve indep with self care tasks.    Following session, patient left in safe position with all fall risk precautions in place.

## 2024-05-03 NOTE — CARE COORDINATION
5/3/24, 1:52 PM EDT    DISCHARGE ON GOING EVALUATION    Tawny [facility] day: 0  Location: -16/016-A Reason for admit: Spinal stenosis of lumbar region, unspecified whether neurogenic claudication present [M48.061]  Spinal stenosis of lumbar region with neurogenic claudication [M48.062]     Procedures:   5/1 Removal of HW 4-Lumbar 5, Lumbar 5 to Sacral 1 Posterior Latereal Interbody Fusion, Lumbar 2 to Lumbar 4 Laminectomy and Posterior Spinal Fusion with Local Bone Graft, Demineralized Bone Matrix and Instrumentation with EBL 1000 mL with return of Cell Saver of 385 mL.     Imaging since last note: none    Barriers to Discharge: Ortho and hospitalist following. POD 2. Precert required when facility accepts. PT/OT. Pain control.     PCP: Gregg Horne MD  No data recorded  Patient Goals/Plan/Treatment Preferences: Planning SNF if accepted. SW made referrals. [facility]PR reached out to CM, does not have criteria/medical complexity. Contacted provider to d/c consult.

## 2024-05-03 NOTE — CONSULTS
Hospitalist Consult Note        Patient:  Tawny Rico  YOB: 1955  Date of Service: 5/3/2024  MRN: [id number]   Acct:  [id number]   Primary Care Physician: Gregg Horne MD    Chief Complaint: Low back pain secondary to lumbar spinal stenosis  Reason for consult:  diabetes management    Date of Service: Pt seen/examined in consultation on 5/3/2024     History Of Present Illness:      Tawny Rico is a 69 y.o. female who we are asked to see/evaluate by Gentry Ortiz MD for medical management of diabetes mellitus type 2.    Patient was admitted under Dr. Ortiz service for lower back pain for which she underwent removal of hardware L4-L5, L5-S1 posterior lateral interbody fusion, L2-L4 laminectomy and posterior spinal fusion with local bone graft, demineralized bone matrix and instrumentation on 5/1/2024.    Postoperatively, she was noted to be hyperglycemic in the 300s, thus medicine were consulted for diabetes management.  Patient doing well on visit.  Denies any fever or chills.  No chest pain or shortness of breath.  No abdominal pain, nausea or vomiting.  No issues with urination.  Reports not having bowel movement since 4/30.  Also reports her sugars usually run in 150s at home and attributes hyperglycemia to not getting her home medications.    She also reported feeling lightheaded.    Assessment and Plan:-  Diabetes mellitus type 2 with hyperglycemia: No hemoglobin A1c available in chart, will check.  Glucose up to 352 noted.  Resumed all of home medications including glipizide, formulary alternative for home Januvia.  Continue metformin.  On low-dose sliding scale insulin as well.  Will check BMP to rule out DKA as well.  Lightheaded: Will check orthostatic vital signs and hold home Bumex if needed.  Constipation: Reports not having bowel movement since 4/30. Already on senna and MiraLAX scheduled.  Will trial milk of magnesia and bisacodyl today.  If ineffective, can trial bisacodyl  Pedicle screws have been inserted posteriorly from L2 to S1 and all appear to be in good position.     Intraoperative  appearance of the lumbar spine. **This report has been created using voice recognition software.  It may contain minor errors which are inherent in voice recognition technology.** Final report electronically signed by Dr. Gibson White on 5/1/2024 1:16 PM    XR LUMBAR SPINE 1 VW    Result Date: 5/1/2024  MOBILE LATERAL LUMBAR SPINE: CLINICAL INFORMATION: surgery COMPARISON: No prior study. TECHNIQUE: A single lateral mobile view of the lumbar spine was obtained during surgery. 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     Intraoperative  appearance of the lumbar spine. **This report has been created using voice recognition software.  It may contain minor errors which are inherent in voice recognition technology.** Final report electronically signed by Dr. Gibson White on 5/1/2024 1:15 PM        EKG: N/A.       THANK YOU FOR THE CONSULT    Electronically signed by Ashita Behl, MD on 5/3/2024 at 12:15 PM

## 2024-05-03 NOTE — PROGRESS NOTES
[facility]  INPATIENT PHYSICAL THERAPY  DAILY NOTE  [facility] ORTHOPEDICS 7K - 7K-16/016-A    Time In: 1405  Time Out: 1431  Timed Code Treatment Minutes: 26 Minutes  Minutes: 26          Date: 5/3/2024  Patient Name: Tawny Rico,  Gender:  female        MRN: [id number]  : 1955  (69 y.o.)     Referring Practitioner: SUKH Matthews CNP  Diagnosis: spinal stenosis of lumbar region  Additional Pertinent Hx: The patient is 69 years of age and struggling with her symptoms of back and leg pain due to spinal stenosis.  She has adjacent level stenosis of L2-L3, L3-L4, and L5-S1 adjacent to a previous L4-L5 decompression and fusion performed in year , 16 years prior.  The patient has significant difficulty standing and walking. Pt underwent Removal of Hardware Lumbar 4-Lumbar 5, Lumbar 5 to Sacral 1 Posterior Latereal Interbody Fusion, Lumbar 2 to Lumbar 4 Laminectomy and Posterior Spinal Fusion with Local Bone Graft, Demineralized Bone Matrix and Instrumentation on  with Dr. Ortiz.     Prior Level of Function:  Lives With: Alone  Type of Home: Apartment (new senior living apartment)  Home Layout: One level  Home Access: Level entry  Home Equipment: Walker, rolling        Ambulation Assistance: Independent  Transfer Assistance: Independent  Active : Yes    Restrictions/Precautions:  Restrictions/Precautions: Fall Risk, Up as Tolerated  Required Braces or Orthoses  Spinal: Lumbar Corset  Spinal Other: per ortho spine PA, okay for abdominal binder until lumbar corset arrives  Position Activity Restriction  Spinal Precautions: No Bending, No Lifting, No Twisting     SUBJECTIVE: RN approves session. Patient resting in recliner and agreeable to therapy.     PAIN: 6/10: during transfers, ambulating relieves pain    Vitals: Vitals not assessed per clinical judgement, see nursing flowsheet    OBJECTIVE:  Bed Mobility:  Not Tested    Transfers:  Sit to Stand: Minimal Assistance, with increased time for  : no LTGs set secondary to short ELOS    Following session, patient left in safe position with all fall risk precautions in place.

## 2024-05-04 LAB
ABO: NORMAL
ANTIBODY SCREEN: NORMAL
BASOPHILS ABSOLUTE: 0 THOU/MM3 (ref 0–0.1)
BASOPHILS NFR BLD AUTO: 0.3 %
DEPRECATED RDW RBC AUTO: 45.1 FL (ref 35–45)
EOSINOPHIL NFR BLD AUTO: 1.4 %
EOSINOPHILS ABSOLUTE: 0.2 THOU/MM3 (ref 0–0.4)
ERYTHROCYTE [DISTWIDTH] IN BLOOD BY AUTOMATED COUNT: 13.3 % (ref 11.5–14.5)
GLUCOSE BLD STRIP.AUTO-MCNC: 136 MG/DL (ref 70–108)
GLUCOSE BLD STRIP.AUTO-MCNC: 171 MG/DL (ref 70–108)
GLUCOSE BLD STRIP.AUTO-MCNC: 256 MG/DL (ref 70–108)
GLUCOSE BLD STRIP.AUTO-MCNC: 268 MG/DL (ref 70–108)
HCT VFR BLD AUTO: 21.3 % (ref 37–47)
HCT VFR BLD AUTO: 25.9 % (ref 37–47)
HGB BLD-MCNC: 7 GM/DL (ref 12–16)
HGB BLD-MCNC: 8.3 GM/DL (ref 12–16)
IMM GRANULOCYTES # BLD AUTO: 0.08 THOU/MM3 (ref 0–0.07)
IMM GRANULOCYTES NFR BLD AUTO: 0.7 %
LYMPHOCYTES ABSOLUTE: 2 THOU/MM3 (ref 1–4.8)
LYMPHOCYTES NFR BLD AUTO: 18.5 %
MCH RBC QN AUTO: 30.7 PG (ref 26–33)
MCHC RBC AUTO-ENTMCNC: 32.9 GM/DL (ref 32.2–35.5)
MCV RBC AUTO: 93.4 FL (ref 81–99)
MONOCYTES ABSOLUTE: 1 THOU/MM3 (ref 0.4–1.3)
MONOCYTES NFR BLD AUTO: 9.4 %
NEUTROPHILS ABSOLUTE: 7.6 THOU/MM3 (ref 1.8–7.7)
NEUTROPHILS NFR BLD AUTO: 69.7 %
NRBC BLD AUTO-RTO: 0 /100 WBC
PLATELET # BLD AUTO: 192 THOU/MM3 (ref 130–400)
PMV BLD AUTO: 9.6 FL (ref 9.4–12.4)
RBC # BLD AUTO: 2.28 MILL/MM3 (ref 4.2–5.4)
RH FACTOR: NORMAL
WBC # BLD AUTO: 10.9 THOU/MM3 (ref 4.8–10.8)

## 2024-05-04 PROCEDURE — 86901 BLOOD TYPING SEROLOGIC RH(D): CPT

## 2024-05-04 PROCEDURE — G0378 HOSPITAL OBSERVATION PER HR: HCPCS

## 2024-05-04 PROCEDURE — 85025 COMPLETE CBC W/AUTO DIFF WBC: CPT

## 2024-05-04 PROCEDURE — 99231 SBSQ HOSP IP/OBS SF/LOW 25: CPT

## 2024-05-04 PROCEDURE — 86850 RBC ANTIBODY SCREEN: CPT

## 2024-05-04 PROCEDURE — 36415 COLL VENOUS BLD VENIPUNCTURE: CPT

## 2024-05-04 PROCEDURE — 6370000000 HC RX 637 (ALT 250 FOR IP): Performed by: STUDENT IN AN ORGANIZED HEALTH CARE EDUCATION/TRAINING PROGRAM

## 2024-05-04 PROCEDURE — 86923 COMPATIBILITY TEST ELECTRIC: CPT

## 2024-05-04 PROCEDURE — 86900 BLOOD TYPING SEROLOGIC ABO: CPT

## 2024-05-04 PROCEDURE — 6370000000 HC RX 637 (ALT 250 FOR IP): Performed by: NURSE PRACTITIONER

## 2024-05-04 PROCEDURE — 2580000003 HC RX 258: Performed by: NURSE PRACTITIONER

## 2024-05-04 PROCEDURE — 85018 HEMOGLOBIN: CPT

## 2024-05-04 PROCEDURE — 36430 TRANSFUSION BLD/BLD COMPNT: CPT

## 2024-05-04 PROCEDURE — P9016 RBC LEUKOCYTES REDUCED: HCPCS

## 2024-05-04 PROCEDURE — 85014 HEMATOCRIT: CPT

## 2024-05-04 PROCEDURE — 82948 REAGENT STRIP/BLOOD GLUCOSE: CPT

## 2024-05-04 RX ORDER — SODIUM CHLORIDE 9 MG/ML
INJECTION, SOLUTION INTRAVENOUS PRN
Status: DISCONTINUED | OUTPATIENT
Start: 2024-05-04 | End: 2024-05-05

## 2024-05-04 RX ADMIN — METHENAMINE HIPPURATE 1 G: 1 TABLET ORAL at 08:33

## 2024-05-04 RX ADMIN — GLIPIZIDE 10 MG: 10 TABLET ORAL at 05:32

## 2024-05-04 RX ADMIN — BETHANECHOL CHLORIDE 25 MG: 25 TABLET ORAL at 14:00

## 2024-05-04 RX ADMIN — OXYCODONE HYDROCHLORIDE AND ACETAMINOPHEN 2 TABLET: 5; 325 TABLET ORAL at 08:35

## 2024-05-04 RX ADMIN — TAMSULOSIN HYDROCHLORIDE 0.4 MG: 0.4 CAPSULE ORAL at 20:27

## 2024-05-04 RX ADMIN — FERROUS SULFATE TAB 325 MG (65 MG ELEMENTAL FE) 325 MG: 325 (65 FE) TAB at 16:17

## 2024-05-04 RX ADMIN — FERROUS SULFATE TAB 325 MG (65 MG ELEMENTAL FE) 325 MG: 325 (65 FE) TAB at 08:33

## 2024-05-04 RX ADMIN — DOCUSATE SODIUM 100 MG: 100 CAPSULE, LIQUID FILLED ORAL at 08:33

## 2024-05-04 RX ADMIN — INSULIN LISPRO 8 UNITS: 100 INJECTION, SOLUTION INTRAVENOUS; SUBCUTANEOUS at 12:13

## 2024-05-04 RX ADMIN — OXYCODONE HYDROCHLORIDE AND ACETAMINOPHEN 2 TABLET: 5; 325 TABLET ORAL at 00:47

## 2024-05-04 RX ADMIN — TAMSULOSIN HYDROCHLORIDE 0.4 MG: 0.4 CAPSULE ORAL at 08:33

## 2024-05-04 RX ADMIN — METFORMIN HYDROCHLORIDE 1000 MG: 500 TABLET ORAL at 16:18

## 2024-05-04 RX ADMIN — POLYETHYLENE GLYCOL 3350 17 G: 17 POWDER, FOR SOLUTION ORAL at 08:33

## 2024-05-04 RX ADMIN — METHENAMINE HIPPURATE 1 G: 1 TABLET ORAL at 16:17

## 2024-05-04 RX ADMIN — ALOGLIPTIN 6.25 MG: 6.25 TABLET, FILM COATED ORAL at 08:33

## 2024-05-04 RX ADMIN — BETHANECHOL CHLORIDE 25 MG: 25 TABLET ORAL at 08:33

## 2024-05-04 RX ADMIN — OXYCODONE HYDROCHLORIDE AND ACETAMINOPHEN 2 TABLET: 5; 325 TABLET ORAL at 20:27

## 2024-05-04 RX ADMIN — SODIUM CHLORIDE, PRESERVATIVE FREE 10 ML: 5 INJECTION INTRAVENOUS at 08:36

## 2024-05-04 RX ADMIN — SENNOSIDES 17.2 MG: 8.6 TABLET, FILM COATED ORAL at 20:27

## 2024-05-04 RX ADMIN — GLIPIZIDE 10 MG: 10 TABLET ORAL at 16:17

## 2024-05-04 RX ADMIN — BETHANECHOL CHLORIDE 25 MG: 25 TABLET ORAL at 20:27

## 2024-05-04 RX ADMIN — SODIUM CHLORIDE, PRESERVATIVE FREE 10 ML: 5 INJECTION INTRAVENOUS at 20:28

## 2024-05-04 RX ADMIN — BUMETANIDE 0.5 MG: 0.5 TABLET ORAL at 08:33

## 2024-05-04 RX ADMIN — LISINOPRIL 5 MG: 5 TABLET ORAL at 08:33

## 2024-05-04 RX ADMIN — METFORMIN HYDROCHLORIDE 1000 MG: 500 TABLET ORAL at 08:33

## 2024-05-04 RX ADMIN — DOCUSATE SODIUM 100 MG: 100 CAPSULE, LIQUID FILLED ORAL at 20:27

## 2024-05-04 RX ADMIN — ATENOLOL 50 MG: 50 TABLET ORAL at 08:33

## 2024-05-04 ASSESSMENT — PAIN SCALES - GENERAL
PAINLEVEL_OUTOF10: 8
PAINLEVEL_OUTOF10: 8
PAINLEVEL_OUTOF10: 3
PAINLEVEL_OUTOF10: 4
PAINLEVEL_OUTOF10: 7
PAINLEVEL_OUTOF10: 8

## 2024-05-04 ASSESSMENT — PAIN DESCRIPTION - LOCATION
LOCATION: BACK

## 2024-05-04 ASSESSMENT — PAIN DESCRIPTION - DESCRIPTORS
DESCRIPTORS: SORE
DESCRIPTORS: ACHING
DESCRIPTORS: ACHING

## 2024-05-04 ASSESSMENT — PAIN DESCRIPTION - ORIENTATION
ORIENTATION: MID;LOWER

## 2024-05-04 ASSESSMENT — PAIN - FUNCTIONAL ASSESSMENT
PAIN_FUNCTIONAL_ASSESSMENT: PREVENTS OR INTERFERES SOME ACTIVE ACTIVITIES AND ADLS
PAIN_FUNCTIONAL_ASSESSMENT: ACTIVITIES ARE NOT PREVENTED
PAIN_FUNCTIONAL_ASSESSMENT: ACTIVITIES ARE NOT PREVENTED

## 2024-05-04 NOTE — PROGRESS NOTES
Department of Orthopedic Surgery  Spine Service  Progress Note        Subjective:   5/4/2024  Tawny is resting in the chair. Just completed 1 unit of blood. Critical value of Hgb 7.0 this morning. Will await repeat H&H s/p transfusion. Reports very tired and fatigued earlier, mild improvement. States she has continued improvement of BLE numbness sensation. Planning SNF, [facility].     Vitals  VITALS:  BP (!) 127/55   Pulse 75   Temp 98.4 °F (36.9 °C)   Resp 18   Ht 1.626 m (5' 4\")   Wt 106.9 kg (235 lb 10.8 oz)   SpO2 98%   BMI 40.45 kg/m²   24HR INTAKE/OUTPUT:    Intake/Output Summary (Last 24 hours) at 5/4/2024 1238  Last data filed at 5/4/2024 1208  Gross per 24 hour   Intake 1021.25 ml   Output 300 ml   Net 721.25 ml     URINARY CATHETER OUTPUT (Disla):  [REMOVED] Urinary Catheter 05/01/24 Disla;2 Way-Output (mL): 450 mL  DRAIN/TUBE OUTPUT:  Closed/Suction Drain Inferior;Lateral;Left Back Accordion-Output (ml): 20 ml  Closed/Suction Drain Inferior;Lateral;Right Back Accordion-Output (ml): 20 ml  VENT SETTINGS:     Additional Respiratory Assessments  Pulse: 75  Respirations: 18  SpO2: 98 %      PHYSICAL EXAM:    Orientation:  alert and oriented to person, place and time    Incision:  dressing in place, clean, dry, and intact    Lower Extremity Motor :  quadriceps, extensor hallucis longus, dorsiflexion, plantarflexion 5/5 bilaterally  Lower Extremity Sensory:  Intact L1-S1    Flatus:  positive    ABNORMAL EXAM FINDINGS:  none    LABS:  Recent Labs     05/04/24  0516   HGB 7.0*   HCT 21.3*       ASSESSMENT AND PLAN:    Post operative day 3    1:  Monitor labs and drain output  2:  Activity Level:  OOB with therapy and staff  3:  Pain Control:  controlled  4:  Discharge Planning:  Discharge pending clinical course. Planning SNF, possible [facility]    Electronically signed by David Villanueva PA-C on 5/4/2024 at 12:36 PM

## 2024-05-04 NOTE — PLAN OF CARE
On contact precaution     Problem: Skin/Tissue Integrity - Adult  Goal: Skin integrity remains intact  5/3/2024 2252 by Anaya Sawant RN  Outcome: Progressing  Flowsheets (Taken 5/3/2024 2252)  Skin Integrity Remains Intact: Monitor for areas of redness and/or skin breakdown     Problem: Skin/Tissue Integrity - Adult  Goal: Incisions, wounds, or drain sites healing without S/S of infection  5/3/2024 2252 by Anaya Sawant RN  Outcome: Progressing  Flowsheets (Taken 5/3/2024 2252)  Incisions, Wounds, or Drain Sites Healing Without Sign and Symptoms of Infection: TWICE DAILY: Assess and document skin integrity     Problem: Musculoskeletal - Adult  Goal: Return mobility to safest level of function  5/3/2024 2252 by Anaya Sawant RN  Outcome: Progressing  Flowsheets (Taken 5/3/2024 2252)  Return Mobility to Safest Level of Function:   Assess patient stability and activity tolerance for standing, transferring and ambulating with or without assistive devices   Assist with transfers and ambulation using safe patient handling equipment as needed   Ensure adequate protection for wounds/incisions during mobilization     Problem: Genitourinary - Adult  Goal: Absence of urinary retention  5/3/2024 2252 by Anaya Sawant RN  Outcome: Progressing  Flowsheets (Taken 5/3/2024 2252)  Absence of urinary retention: Assess patient’s ability to void and empty bladder     Problem: Hematologic - Adult  Goal: Maintains hematologic stability  5/3/2024 2252 by Anaya Sawant RN  Outcome: Progressing  Flowsheets (Taken 5/3/2024 2252)  Maintains hematologic stability:   Assess for signs and symptoms of bleeding or hemorrhage   Monitor labs for bleeding or clotting disorders     Care plan reviewed with patient.  Patient verbalized understanding of the plan of care and contribute to goal setting.

## 2024-05-04 NOTE — PLAN OF CARE
and assess patient's chronic conditions and comorbid symptoms for stability, deterioration, or improvement   Collaborate with multidisciplinary team to address chronic and comorbid conditions and prevent exacerbation or deterioration     Problem: Pain  Goal: Verbalizes/displays adequate comfort level or baseline comfort level  5/4/2024 0905 by Frandy Oswald RN  Outcome: Progressing  Flowsheets (Taken 5/4/2024 0905)  Verbalizes/displays adequate comfort level or baseline comfort level:   Encourage patient to monitor pain and request assistance   Administer analgesics based on type and severity of pain and evaluate response   Consider cultural and social influences on pain and pain management   Assess pain using appropriate pain scale   Implement non-pharmacological measures as appropriate and evaluate response   Notify Licensed Independent Practitioner if interventions unsuccessful or patient reports new pain  5/3/2024 2252 by Anaya Sawant RN  Outcome: Progressing  Flowsheets (Taken 5/3/2024 2252)  Verbalizes/displays adequate comfort level or baseline comfort level:   Encourage patient to monitor pain and request assistance   Assess pain using appropriate pain scale   Administer analgesics based on type and severity of pain and evaluate response   Implement non-pharmacological measures as appropriate and evaluate response     Problem: Safety - Adult  Goal: Free from fall injury  5/4/2024 0905 by Frandy Oswald RN  Outcome: Progressing  Flowsheets (Taken 5/4/2024 0905)  Free From Fall Injury: Instruct family/caregiver on patient safety  5/3/2024 2252 by Anaya Sawant RN  Outcome: Progressing  Flowsheets (Taken 5/3/2024 2252)  Free From Fall Injury: Instruct family/caregiver on patient safety  Goal: Isolation precautions  Description: Isolation precautions  5/4/2024 0905 by Frandy Oswald RN  Outcome: Progressing  5/3/2024 2252 by Anaya Sawant RN  Outcome: Progressing  Note: On contact precaution      Problem: Skin/Tissue Integrity - Adult  Goal: Skin integrity remains intact  5/4/2024 0905 by Frandy Oswald RN  Outcome: Progressing  Flowsheets (Taken 5/4/2024 0905)  Skin Integrity Remains Intact:   Monitor for areas of redness and/or skin breakdown   Assess vascular access sites hourly  5/3/2024 2252 by Anaya Sawant RN  Outcome: Progressing  Flowsheets (Taken 5/3/2024 2252)  Skin Integrity Remains Intact: Monitor for areas of redness and/or skin breakdown  Goal: Incisions, wounds, or drain sites healing without S/S of infection  5/4/2024 0905 by Frandy Oswald RN  Outcome: Progressing  Flowsheets (Taken 5/4/2024 0905)  Incisions, Wounds, or Drain Sites Healing Without Sign and Symptoms of Infection:   ADMISSION and DAILY: Assess and document risk factors for pressure ulcer development   TWICE DAILY: Assess and document skin integrity   TWICE DAILY: Assess and document dressing/incision, wound bed, drain sites and surrounding tissue   Implement wound care per orders   Initiate pressure ulcer prevention bundle as indicated  5/3/2024 2252 by Anaya Sawant RN  Outcome: Progressing  Flowsheets (Taken 5/3/2024 2252)  Incisions, Wounds, or Drain Sites Healing Without Sign and Symptoms of Infection: TWICE DAILY: Assess and document skin integrity     Problem: Musculoskeletal - Adult  Goal: Return mobility to safest level of function  5/4/2024 0905 by Frandy Oswald RN  Outcome: Progressing  Flowsheets (Taken 5/4/2024 0905)  Return Mobility to Safest Level of Function:   Assess patient stability and activity tolerance for standing, transferring and ambulating with or without assistive devices   Assist with transfers and ambulation using safe patient handling equipment as needed   Ensure adequate protection for wounds/incisions during mobilization   Obtain physical therapy/occupational therapy consults as needed   Instruct patient/family in ordered activity level  5/3/2024 2252 by Anaya Sawant RN  Outcome:

## 2024-05-04 NOTE — PROGRESS NOTES
Hospitalist Progress Note      Patient:  Tawny Rico    Unit/Bed:7K-16/016-A  YOB: 1955  MRN: [id number]   Acct: [id number]   PCP: Gregg Horne MD  Date of Admission: 5/1/2024    Assessment/Plan:  Lower back pain s/p decompression and fusion in 2008, status post removal of hardware L4-L5, L5-S1 posterior lateral interbody fusion, L2-L4 laminectomy and posterior spinal fusion with local bone graft, demineralized bone matrix and instrumentation on 5/1/2024 with Dr. Ortiz:   Management per primary, Dr. Ortiz. POD #3. EBL 1000cc. Hemovac x 2 in place.  Hospitalist consulted due to hypoglycemia with history of T2DM.     Diabetes mellitus type 2 with hyperglycemia:   Hemoglobin A1c 7.2(5/3).  POCT glucose trend improved.  On home medications including metformin and glipizide, formulary alternative for home Januvia.  Now on high-dose SSI.  BMP with mild metabolic acidosis and no anion gap.  Continue POCT glucose checks, daily BMP, hypoglycemia protocol.    Acute on chronic normocytic anemia:    Hemoglobin 7.0 today, status post 1 unit PRBC, repeat hemoglobin 8.3.  Monitor H&H every 6 hours, repeat CBC in the AM.    Lightheaded:   Orthostatic vitals negative, suspect secondary to pain medication.  Continue to monitor, fall precautions.      Constipation:   Reports not having bowel movement since 4/30, passing flatus. Already on senna and MiraLAX scheduled.  Will trial milk of magnesia and bisacodyl today.  If ineffective, can trial bisacodyl suppository, followed by enema if needed.    Hypertension: Stable. Continue home antihypertensives with hold parameters.  Recurrent UTI: Denies any symptoms currently.  On chronic Hip-Kelvin, will continue  Urinary retention: Continue home bethanechol and tamsulosin  Allergies: Zyrtec as needed    Chief Complaint: \"Low back pain secondary to lumbar spinal stenosis\"    Initial H and P:-    History  technology.**      Final report electronically signed by Dr. Gibson White on 5/1/2024 1:15 PM        XR LUMBAR SPINE (2-3 VIEWS)    Result Date: 5/1/2024  LUMBAR SPINE 2 VIEWS: CLINICAL INFORMATION: surgery COMPARISON: No prior study. TECHNIQUE: Standard AP and crossfire lateral views of lumbar spine were obtained. FINDINGS: Posterior lumbar fusion has been performed, L2-S1 with metallic hardware in place. There is mild retrolisthesis L2 upon L3, 5 mm and mild antegrade spondylolisthesis of L4 upon L5, 3 mm.. The lumbar vertebra are otherwise normally aligned. The lumbar vertebra are otherwise normally aligned.     Postop appearance lumbar spine. **This report has been created using voice recognition software.  It may contain minor errors which are inherent in voice recognition technology.** Final report electronically signed by Dr. Gibson White on 5/1/2024 1:17 PM    XR LUMBAR SPINE 1 VW    Result Date: 5/1/2024  MOBILE LATERAL LUMBAR SPINE: CLINICAL INFORMATION: surgery COMPARISON: No prior study. TECHNIQUE: A single lateral mobile view of the lumbar spine was obtained during surgery. FINDINGS: A metallic disc spacer device is present at the L5-S1 level. Pedicle screws have been inserted posteriorly from L2 to S1 and all appear to be in good position.     Intraoperative  appearance of the lumbar spine. **This report has been created using voice recognition software.  It may contain minor errors which are inherent in voice recognition technology.** Final report electronically signed by Dr. Gibson White on 5/1/2024 1:16 PM    XR LUMBAR SPINE 1 VW    Result Date: 5/1/2024  MOBILE LATERAL LUMBAR SPINE: CLINICAL INFORMATION: surgery COMPARISON: No prior study. TECHNIQUE: A single lateral mobile view of the lumbar spine was obtained during surgery. 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     Intraoperative  appearance of the lumbar spine. **This report has been

## 2024-05-05 ENCOUNTER — APPOINTMENT (OUTPATIENT)
Dept: INTERVENTIONAL RADIOLOGY/VASCULAR | Age: 69
DRG: 454 | End: 2024-05-05
Attending: ORTHOPAEDIC SURGERY
Payer: MEDICARE

## 2024-05-05 LAB
ANION GAP SERPL CALC-SCNC: 6 MEQ/L (ref 8–16)
BASOPHILS ABSOLUTE: 0 THOU/MM3 (ref 0–0.1)
BASOPHILS NFR BLD AUTO: 0.3 %
BUN SERPL-MCNC: 32 MG/DL (ref 7–22)
CALCIUM SERPL-MCNC: 9.3 MG/DL (ref 8.5–10.5)
CHLORIDE SERPL-SCNC: 102 MEQ/L (ref 98–111)
CO2 SERPL-SCNC: 25 MEQ/L (ref 23–33)
CREAT SERPL-MCNC: 0.9 MG/DL (ref 0.4–1.2)
DEPRECATED RDW RBC AUTO: 46.6 FL (ref 35–45)
EOSINOPHIL NFR BLD AUTO: 1.8 %
EOSINOPHILS ABSOLUTE: 0.2 THOU/MM3 (ref 0–0.4)
ERYTHROCYTE [DISTWIDTH] IN BLOOD BY AUTOMATED COUNT: 13.9 % (ref 11.5–14.5)
GFR SERPL CREATININE-BSD FRML MDRD: 69 ML/MIN/1.73M2
GLUCOSE BLD STRIP.AUTO-MCNC: 108 MG/DL (ref 70–108)
GLUCOSE BLD STRIP.AUTO-MCNC: 177 MG/DL (ref 70–108)
GLUCOSE BLD STRIP.AUTO-MCNC: 186 MG/DL (ref 70–108)
GLUCOSE BLD STRIP.AUTO-MCNC: 257 MG/DL (ref 70–108)
GLUCOSE SERPL-MCNC: 158 MG/DL (ref 70–108)
HCT VFR BLD AUTO: 22.8 % (ref 37–47)
HCT VFR BLD AUTO: 24.3 % (ref 37–47)
HCT VFR BLD AUTO: 24.4 % (ref 37–47)
HGB BLD-MCNC: 7.5 GM/DL (ref 12–16)
HGB BLD-MCNC: 8 GM/DL (ref 12–16)
HGB BLD-MCNC: 8 GM/DL (ref 12–16)
IMM GRANULOCYTES # BLD AUTO: 0.07 THOU/MM3 (ref 0–0.07)
IMM GRANULOCYTES NFR BLD AUTO: 0.8 %
LYMPHOCYTES ABSOLUTE: 1.4 THOU/MM3 (ref 1–4.8)
LYMPHOCYTES NFR BLD AUTO: 15.8 %
MCH RBC QN AUTO: 30.2 PG (ref 26–33)
MCHC RBC AUTO-ENTMCNC: 32.9 GM/DL (ref 32.2–35.5)
MCV RBC AUTO: 91.9 FL (ref 81–99)
MONOCYTES ABSOLUTE: 1 THOU/MM3 (ref 0.4–1.3)
MONOCYTES NFR BLD AUTO: 11.3 %
NEUTROPHILS ABSOLUTE: 6.4 THOU/MM3 (ref 1.8–7.7)
NEUTROPHILS NFR BLD AUTO: 70 %
NRBC BLD AUTO-RTO: 0 /100 WBC
PLATELET # BLD AUTO: 173 THOU/MM3 (ref 130–400)
PMV BLD AUTO: 9.7 FL (ref 9.4–12.4)
POTASSIUM SERPL-SCNC: 4.9 MEQ/L (ref 3.5–5.2)
RBC # BLD AUTO: 2.48 MILL/MM3 (ref 4.2–5.4)
SODIUM SERPL-SCNC: 133 MEQ/L (ref 135–145)
WBC # BLD AUTO: 9.1 THOU/MM3 (ref 4.8–10.8)

## 2024-05-05 PROCEDURE — G0378 HOSPITAL OBSERVATION PER HR: HCPCS

## 2024-05-05 PROCEDURE — 80048 BASIC METABOLIC PNL TOTAL CA: CPT

## 2024-05-05 PROCEDURE — 85014 HEMATOCRIT: CPT

## 2024-05-05 PROCEDURE — 6370000000 HC RX 637 (ALT 250 FOR IP): Performed by: NURSE PRACTITIONER

## 2024-05-05 PROCEDURE — 97535 SELF CARE MNGMENT TRAINING: CPT

## 2024-05-05 PROCEDURE — 99231 SBSQ HOSP IP/OBS SF/LOW 25: CPT

## 2024-05-05 PROCEDURE — 6370000000 HC RX 637 (ALT 250 FOR IP): Performed by: STUDENT IN AN ORGANIZED HEALTH CARE EDUCATION/TRAINING PROGRAM

## 2024-05-05 PROCEDURE — 82948 REAGENT STRIP/BLOOD GLUCOSE: CPT

## 2024-05-05 PROCEDURE — 85018 HEMOGLOBIN: CPT

## 2024-05-05 PROCEDURE — 93970 EXTREMITY STUDY: CPT

## 2024-05-05 PROCEDURE — 2580000003 HC RX 258: Performed by: NURSE PRACTITIONER

## 2024-05-05 PROCEDURE — 85025 COMPLETE CBC W/AUTO DIFF WBC: CPT

## 2024-05-05 PROCEDURE — 36415 COLL VENOUS BLD VENIPUNCTURE: CPT

## 2024-05-05 RX ADMIN — DOCUSATE SODIUM 100 MG: 100 CAPSULE, LIQUID FILLED ORAL at 20:03

## 2024-05-05 RX ADMIN — FERROUS SULFATE TAB 325 MG (65 MG ELEMENTAL FE) 325 MG: 325 (65 FE) TAB at 08:11

## 2024-05-05 RX ADMIN — LISINOPRIL 5 MG: 5 TABLET ORAL at 08:12

## 2024-05-05 RX ADMIN — OXYCODONE HYDROCHLORIDE AND ACETAMINOPHEN 2 TABLET: 5; 325 TABLET ORAL at 22:01

## 2024-05-05 RX ADMIN — METHENAMINE HIPPURATE 1 G: 1 TABLET ORAL at 08:11

## 2024-05-05 RX ADMIN — OXYCODONE HYDROCHLORIDE AND ACETAMINOPHEN 2 TABLET: 5; 325 TABLET ORAL at 04:16

## 2024-05-05 RX ADMIN — SODIUM CHLORIDE, PRESERVATIVE FREE 10 ML: 5 INJECTION INTRAVENOUS at 20:03

## 2024-05-05 RX ADMIN — ALOGLIPTIN 6.25 MG: 6.25 TABLET, FILM COATED ORAL at 08:11

## 2024-05-05 RX ADMIN — GLIPIZIDE 10 MG: 10 TABLET ORAL at 05:17

## 2024-05-05 RX ADMIN — METHENAMINE HIPPURATE 1 G: 1 TABLET ORAL at 16:59

## 2024-05-05 RX ADMIN — METFORMIN HYDROCHLORIDE 1000 MG: 500 TABLET ORAL at 16:59

## 2024-05-05 RX ADMIN — BETHANECHOL CHLORIDE 25 MG: 25 TABLET ORAL at 08:12

## 2024-05-05 RX ADMIN — GLIPIZIDE 10 MG: 10 TABLET ORAL at 17:00

## 2024-05-05 RX ADMIN — METFORMIN HYDROCHLORIDE 1000 MG: 500 TABLET ORAL at 08:11

## 2024-05-05 RX ADMIN — BETHANECHOL CHLORIDE 25 MG: 25 TABLET ORAL at 20:03

## 2024-05-05 RX ADMIN — OXYCODONE HYDROCHLORIDE AND ACETAMINOPHEN 2 TABLET: 5; 325 TABLET ORAL at 11:29

## 2024-05-05 RX ADMIN — POLYETHYLENE GLYCOL 3350 17 G: 17 POWDER, FOR SOLUTION ORAL at 08:11

## 2024-05-05 RX ADMIN — SENNOSIDES 17.2 MG: 8.6 TABLET, FILM COATED ORAL at 20:02

## 2024-05-05 RX ADMIN — TAMSULOSIN HYDROCHLORIDE 0.4 MG: 0.4 CAPSULE ORAL at 20:02

## 2024-05-05 RX ADMIN — BETHANECHOL CHLORIDE 25 MG: 25 TABLET ORAL at 13:35

## 2024-05-05 RX ADMIN — INSULIN LISPRO 8 UNITS: 100 INJECTION, SOLUTION INTRAVENOUS; SUBCUTANEOUS at 11:31

## 2024-05-05 RX ADMIN — ATENOLOL 50 MG: 50 TABLET ORAL at 08:12

## 2024-05-05 RX ADMIN — TAMSULOSIN HYDROCHLORIDE 0.4 MG: 0.4 CAPSULE ORAL at 08:11

## 2024-05-05 RX ADMIN — CYCLOBENZAPRINE 10 MG: 10 TABLET, FILM COATED ORAL at 22:02

## 2024-05-05 RX ADMIN — SODIUM CHLORIDE, PRESERVATIVE FREE 10 ML: 5 INJECTION INTRAVENOUS at 08:14

## 2024-05-05 RX ADMIN — BUMETANIDE 0.5 MG: 0.5 TABLET ORAL at 08:12

## 2024-05-05 RX ADMIN — DOCUSATE SODIUM 100 MG: 100 CAPSULE, LIQUID FILLED ORAL at 08:11

## 2024-05-05 RX ADMIN — FERROUS SULFATE TAB 325 MG (65 MG ELEMENTAL FE) 325 MG: 325 (65 FE) TAB at 16:59

## 2024-05-05 ASSESSMENT — PAIN SCALES - GENERAL
PAINLEVEL_OUTOF10: 5
PAINLEVEL_OUTOF10: 9
PAINLEVEL_OUTOF10: 0
PAINLEVEL_OUTOF10: 3
PAINLEVEL_OUTOF10: 8
PAINLEVEL_OUTOF10: 0
PAINLEVEL_OUTOF10: 8

## 2024-05-05 ASSESSMENT — PAIN DESCRIPTION - ORIENTATION
ORIENTATION: MID;LOWER
ORIENTATION: POSTERIOR
ORIENTATION: MID

## 2024-05-05 ASSESSMENT — PAIN DESCRIPTION - DESCRIPTORS
DESCRIPTORS: STABBING
DESCRIPTORS: ACHING
DESCRIPTORS: ACHING;DISCOMFORT

## 2024-05-05 ASSESSMENT — PAIN DESCRIPTION - ONSET: ONSET: PROGRESSIVE

## 2024-05-05 ASSESSMENT — PAIN DESCRIPTION - PAIN TYPE: TYPE: ACUTE PAIN;SURGICAL PAIN

## 2024-05-05 ASSESSMENT — PAIN DESCRIPTION - LOCATION
LOCATION: BACK

## 2024-05-05 ASSESSMENT — PAIN DESCRIPTION - FREQUENCY: FREQUENCY: INTERMITTENT

## 2024-05-05 NOTE — PLAN OF CARE
Problem: Discharge Planning  Goal: Discharge to home or other facility with appropriate resources  5/4/2024 2252 by Anaay Sawant RN  Outcome: Progressing  Flowsheets (Taken 5/4/2024 2252)  Discharge to home or other facility with appropriate resources:   Identify barriers to discharge with patient and caregiver   Arrange for needed discharge resources and transportation as appropriate   Identify discharge learning needs (meds, wound care, etc)     Problem: Chronic Conditions and Co-morbidities  Goal: Patient's chronic conditions and co-morbidity symptoms are monitored and maintained or improved  5/4/2024 2252 by Anaya Sawant RN  Outcome: Progressing  Flowsheets (Taken 5/4/2024 2252)  Care Plan - Patient's Chronic Conditions and Co-Morbidity Symptoms are Monitored and Maintained or Improved:   Monitor and assess patient's chronic conditions and comorbid symptoms for stability, deterioration, or improvement   Collaborate with multidisciplinary team to address chronic and comorbid conditions and prevent exacerbation or deterioration     Problem: Pain  Goal: Verbalizes/displays adequate comfort level or baseline comfort level  5/4/2024 2252 by Anaya Sawant RN  Outcome: Progressing  Flowsheets (Taken 5/4/2024 2252)  Verbalizes/displays adequate comfort level or baseline comfort level:   Encourage patient to monitor pain and request assistance   Assess pain using appropriate pain scale   Administer analgesics based on type and severity of pain and evaluate response   Implement non-pharmacological measures as appropriate and evaluate response     Problem: Safety - Adult  Goal: Free from fall injury  5/4/2024 2252 by Anaya Sawant RN  Outcome: Progressing  Flowsheets (Taken 5/4/2024 2252)  Free From Fall Injury: Instruct family/caregiver on patient safety     Problem: Safety - Adult  Goal: Isolation precautions  Description: Isolation precautions  5/4/2024 2252 by Anaya Sawant RN  Outcome: Progressing  Note:

## 2024-05-05 NOTE — CONSENT
Informed Consent for Blood Component Transfusion Note    I have discussed with the patient the rationale for blood component transfusion; its benefits in treating or preventing fatigue, organ damage, or death; and its risk which includes mild transfusion reactions, rare risk of blood borne infection, or more serious but rare reactions. I have discussed the alternatives to transfusion, including the risk and consequences of not receiving transfusion. The patient had an opportunity to ask questions and had agreed to proceed with transfusion of blood components.    Electronically signed by Liliana Hussein PA-C on 5/5/24 at 10:29 AM EDT

## 2024-05-05 NOTE — PROGRESS NOTES
[facility] ORTHOPEDICS 7K  Occupational Therapy  Daily Note  Time:   Time In: 940  Time Out: 1019  Timed Code Treatment Minutes: 39 Minutes  Minutes: 39          Date: 2024  Patient Name: Tawny Rico,   Gender: female      Room: [facility]16/016-A  MRN: [id number]  : 1955  (69 y.o.)  Referring Practitioner: Gisel Matthews, WILLIS - CNP  Diagnosis: spinal stenosis of lumbar region , unspecified weather neurogenic claudication present  Additional Pertinent Hx: The patient is 69 years of age and struggling with her symptoms of back and leg pain due to spinal stenosis.  She has adjacent level stenosis of L2-L3, L3-L4, and L5-S1 adjacent to a previous L4-L5 decompression and fusion performed in year , 16 years prior.  The patient has significant difficulty standing and walking. Pt underwent Removal of Hardware Lumbar 4-Lumbar 5, Lumbar 5 to Sacral 1 Posterior Latereal Interbody Fusion, Lumbar 2 to Lumbar 4 Laminectomy and Posterior Spinal Fusion with Local Bone Graft, Demineralized Bone Matrix and Instrumentation on  with Dr. Ortiz.    Restrictions/Precautions:  Restrictions/Precautions: Fall Risk, Up as Tolerated  Required Braces or Orthoses  Spinal: Lumbar Corset  Spinal Other: per ortho spine PA, okay for abdominal binder until lumbar corset arrives  Position Activity Restriction  Spinal Precautions: No Bending, No Lifting, No Twisting     Social/Functional History:  Lives With: Alone  Type of Home: Apartment (new senior living apartment)  Home Layout: One level  Home Access: Level entry  Home Equipment: Walker, rolling           Ambulation Assistance: Independent  Transfer Assistance: Independent    Active : Yes          SUBJECTIVE: Patient agreeable to OT with min encouragement but did voice fatigue / frustrations. After active listening and support, and YOSI hollis completed, pt reports feeling much better - \"I have a whole new personality now!\". RN ok'ed session. Pt c/o

## 2024-05-05 NOTE — PROGRESS NOTES
Department of Orthopedic Surgery  Spine Service  Progress Note        Subjective:   5/4/2024  Tawny is resting in the chair. Just completed 1 unit of blood. Critical value of Hgb 7.0 this morning. Will await repeat H&H s/p transfusion. Reports very tired and fatigued earlier, mild improvement. States she has continued improvement of BLE numbness sensation. Planning SNF, possible [facility].     5/5/2024  Tawny is resting in the chair. Reports fatigue today. Hgb 7.5. +flatus, no BM. Intermittent BLE numbness improving. Surgical pain overall controlled. Bilat calf pain noted, will order US bilat to r/o DVT.     Vitals  VITALS:  BP (!) 122/56   Pulse 73   Temp 97.5 °F (36.4 °C) (Oral)   Resp 16   Ht 1.626 m (5' 4\")   Wt 106.9 kg (235 lb 10.8 oz)   SpO2 98%   BMI 40.45 kg/m²   24HR INTAKE/OUTPUT:    Intake/Output Summary (Last 24 hours) at 5/5/2024 1154  Last data filed at 5/5/2024 0544  Gross per 24 hour   Intake 1000 ml   Output 140 ml   Net 860 ml     URINARY CATHETER OUTPUT (Disla):  [REMOVED] Urinary Catheter 05/01/24 Disla;2 Way-Output (mL): 450 mL  DRAIN/TUBE OUTPUT:  Closed/Suction Drain Inferior;Lateral;Left Back Accordion-Output (ml): 5 ml  Closed/Suction Drain Inferior;Lateral;Right Back Accordion-Output (ml): 90 ml  VENT SETTINGS:     Additional Respiratory Assessments  Pulse: 73  Respirations: 16  SpO2: 98 %      PHYSICAL EXAM:    Orientation:  alert and oriented to person, place and time    Incision:  dressing in place, clean, dry, and intact    Lower Extremity Motor :  quadriceps, extensor hallucis longus, dorsiflexion, plantarflexion 5/5 bilaterally  Lower Extremity Sensory:  Intact L1-S1    Flatus:  positive    ABNORMAL EXAM FINDINGS:  Bilat calf pain and edema    LABS:  Recent Labs     05/05/24  0340   HGB 7.5*   HCT 22.8*       ASSESSMENT AND PLAN:    Post operative day 4    1:  Monitor labs and drain output  2:  Activity Level:  OOB with therapy and staff  3:  Pain Control:  controlled  4:

## 2024-05-05 NOTE — PROGRESS NOTES
Daily    bethanechol  25 mg Oral TID    lisinopril  5 mg Oral Daily    metFORMIN  1,000 mg Oral BID WC    methenamine  1 g Oral BID WC    sodium chloride flush  5-40 mL IntraVENous 2 times per day    polyethylene glycol  17 g Oral Daily    docusate sodium  100 mg Oral BID     PRN Meds: sodium chloride, cetirizine, sodium chloride flush, sodium chloride, acetaminophen, ondansetron **OR** ondansetron, bisacodyl, sodium phosphate, cyclobenzaprine, oxyCODONE-acetaminophen **OR** oxyCODONE-acetaminophen, glucose, dextrose bolus **OR** dextrose bolus, glucagon (rDNA), dextrose      Intake/Output Summary (Last 24 hours) at 5/5/2024 1018  Last data filed at 5/5/2024 0544  Gross per 24 hour   Intake 1331.25 ml   Output 140 ml   Net 1191.25 ml         Diet:  ADULT ORAL NUTRITION SUPPLEMENT; Breakfast, Lunch, Dinner; Other Oral Supplement; Activia and hot beverage  ADULT DIET; Regular; 3 carb choices (45 gm/meal)    Physical Exam:  BP (!) 113/58   Pulse 72   Temp 98.1 °F (36.7 °C)   Resp 20   Ht 1.626 m (5' 4\")   Wt 106.9 kg (235 lb 10.8 oz)   SpO2 97%   BMI 40.45 kg/m²   General appearance: Chronically ill-appearing.  No apparent distress, appears stated age and cooperative.  HEENT: Pupils equal, round, and reactive to light. Conjunctivae/corneas clear.  Neck: Supple, with full range of motion. No jugular venous distention. Trachea midline.  Respiratory:  Normal respiratory effort. Clear to auscultation, bilaterally without Rales/Wheezes/Rhonchi.  Cardiovascular: Regular rate and rhythm with normal S1/S2 without murmurs, rubs or gallops.  Abdomen: Soft, non-tender, non-distended with normal bowel sounds.  Musculoskeletal: 2-3+ pitting edema noted in lower extremities bilaterally.  Passive and active ROM x 4 extremities.  Skin: Skin color, texture, turgor normal.  No rashes or lesions.  Neurologic:  Neurovascularly intact without any focal sensory/motor deficits. Cranial nerves: II-XII intact, grossly  of the lumbar spine was obtained during surgery. 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     Intraoperative  appearance of the lumbar spine. **This report has been created using voice recognition software.  It may contain minor errors which are inherent in voice recognition technology.** Final report electronically signed by Dr. Gibson White on 5/1/2024 1:15 PM      Electronically signed by Liliana Hussein PA-C on 5/5/2024 at 10:18 AM

## 2024-05-05 NOTE — PLAN OF CARE
level  5/4/2024 2252 by Anaya Sawant RN  Outcome: Progressing  Flowsheets (Taken 5/4/2024 2252)  Return Mobility to Safest Level of Function:   Assess patient stability and activity tolerance for standing, transferring and ambulating with or without assistive devices   Assist with transfers and ambulation using safe patient handling equipment as needed   Ensure adequate protection for wounds/incisions during mobilization     Problem: Genitourinary - Adult  Goal: Absence of urinary retention  5/5/2024 0851 by Frandy Oswald RN  Outcome: Progressing  Flowsheets (Taken 5/5/2024 0851)  Absence of urinary retention:   Assess patient’s ability to void and empty bladder   Monitor intake/output and perform bladder scan as needed  5/4/2024 2252 by Anaya Sawant RN  Outcome: Progressing     Problem: Hematologic - Adult  Goal: Maintains hematologic stability  5/5/2024 0851 by Frandy Oswald RN  Outcome: Progressing  Flowsheets (Taken 5/5/2024 0851)  Maintains hematologic stability:   Assess for signs and symptoms of bleeding or hemorrhage   Monitor labs for bleeding or clotting disorders   Administer blood products/factors as ordered  5/4/2024 2252 by Anaya Sawant RN  Outcome: Progressing  Flowsheets (Taken 5/4/2024 2252)  Maintains hematologic stability:   Assess for signs and symptoms of bleeding or hemorrhage   Monitor labs for bleeding or clotting disorders     Problem: ABCDS Injury Assessment  Goal: Absence of physical injury  5/5/2024 0851 by Frandy Oswald RN  Outcome: Progressing  Flowsheets (Taken 5/5/2024 0851)  Absence of Physical Injury: Implement safety measures based on patient assessment  5/4/2024 2252 by Anaya Sawant RN  Outcome: Progressing  Flowsheets (Taken 5/4/2024 2252)  Absence of Physical Injury: Implement safety measures based on patient assessment     Problem: Skin/Tissue Integrity  Goal: Absence of new skin breakdown  Description: 1.  Monitor for areas of redness and/or skin

## 2024-05-06 PROBLEM — M48.062 LUMBAR STENOSIS WITH NEUROGENIC CLAUDICATION: Status: ACTIVE | Noted: 2024-05-06

## 2024-05-06 LAB
ANION GAP SERPL CALC-SCNC: 9 MEQ/L (ref 8–16)
BASOPHILS ABSOLUTE: 0 THOU/MM3 (ref 0–0.1)
BASOPHILS NFR BLD AUTO: 0.4 %
BUN SERPL-MCNC: 32 MG/DL (ref 7–22)
CALCIUM SERPL-MCNC: 10 MG/DL (ref 8.5–10.5)
CHLORIDE SERPL-SCNC: 99 MEQ/L (ref 98–111)
CO2 SERPL-SCNC: 23 MEQ/L (ref 23–33)
CREAT SERPL-MCNC: 0.9 MG/DL (ref 0.4–1.2)
DEPRECATED RDW RBC AUTO: 46.5 FL (ref 35–45)
EOSINOPHIL NFR BLD AUTO: 2.9 %
EOSINOPHILS ABSOLUTE: 0.2 THOU/MM3 (ref 0–0.4)
ERYTHROCYTE [DISTWIDTH] IN BLOOD BY AUTOMATED COUNT: 14 % (ref 11.5–14.5)
GFR SERPL CREATININE-BSD FRML MDRD: 69 ML/MIN/1.73M2
GLUCOSE BLD STRIP.AUTO-MCNC: 131 MG/DL (ref 70–108)
GLUCOSE BLD STRIP.AUTO-MCNC: 140 MG/DL (ref 70–108)
GLUCOSE BLD STRIP.AUTO-MCNC: 176 MG/DL (ref 70–108)
GLUCOSE BLD STRIP.AUTO-MCNC: 218 MG/DL (ref 70–108)
GLUCOSE SERPL-MCNC: 124 MG/DL (ref 70–108)
HCT VFR BLD AUTO: 24.2 % (ref 37–47)
HCT VFR BLD AUTO: 25.2 % (ref 37–47)
HCT VFR BLD AUTO: 25.2 % (ref 37–47)
HCT VFR BLD AUTO: 26.5 % (ref 37–47)
HGB BLD-MCNC: 8 GM/DL (ref 12–16)
HGB BLD-MCNC: 8.1 GM/DL (ref 12–16)
HGB BLD-MCNC: 8.3 GM/DL (ref 12–16)
HGB BLD-MCNC: 8.7 GM/DL (ref 12–16)
IMM GRANULOCYTES # BLD AUTO: 0.06 THOU/MM3 (ref 0–0.07)
IMM GRANULOCYTES NFR BLD AUTO: 0.8 %
LYMPHOCYTES ABSOLUTE: 1.5 THOU/MM3 (ref 1–4.8)
LYMPHOCYTES NFR BLD AUTO: 18.6 %
MCH RBC QN AUTO: 29.7 PG (ref 26–33)
MCHC RBC AUTO-ENTMCNC: 32.1 GM/DL (ref 32.2–35.5)
MCV RBC AUTO: 92.3 FL (ref 81–99)
MONOCYTES ABSOLUTE: 0.9 THOU/MM3 (ref 0.4–1.3)
MONOCYTES NFR BLD AUTO: 11.5 %
NEUTROPHILS ABSOLUTE: 5.3 THOU/MM3 (ref 1.8–7.7)
NEUTROPHILS NFR BLD AUTO: 65.8 %
NRBC BLD AUTO-RTO: 0 /100 WBC
PLATELET # BLD AUTO: 239 THOU/MM3 (ref 130–400)
PMV BLD AUTO: 9.3 FL (ref 9.4–12.4)
POTASSIUM SERPL-SCNC: 4.3 MEQ/L (ref 3.5–5.2)
RBC # BLD AUTO: 2.73 MILL/MM3 (ref 4.2–5.4)
SODIUM SERPL-SCNC: 131 MEQ/L (ref 135–145)
WBC # BLD AUTO: 8 THOU/MM3 (ref 4.8–10.8)

## 2024-05-06 PROCEDURE — 6370000000 HC RX 637 (ALT 250 FOR IP): Performed by: NURSE PRACTITIONER

## 2024-05-06 PROCEDURE — 97530 THERAPEUTIC ACTIVITIES: CPT

## 2024-05-06 PROCEDURE — 80048 BASIC METABOLIC PNL TOTAL CA: CPT

## 2024-05-06 PROCEDURE — 85014 HEMATOCRIT: CPT

## 2024-05-06 PROCEDURE — 85018 HEMOGLOBIN: CPT

## 2024-05-06 PROCEDURE — 36415 COLL VENOUS BLD VENIPUNCTURE: CPT

## 2024-05-06 PROCEDURE — 6370000000 HC RX 637 (ALT 250 FOR IP): Performed by: STUDENT IN AN ORGANIZED HEALTH CARE EDUCATION/TRAINING PROGRAM

## 2024-05-06 PROCEDURE — 97535 SELF CARE MNGMENT TRAINING: CPT

## 2024-05-06 PROCEDURE — 97110 THERAPEUTIC EXERCISES: CPT

## 2024-05-06 PROCEDURE — 2580000003 HC RX 258: Performed by: NURSE PRACTITIONER

## 2024-05-06 PROCEDURE — 85025 COMPLETE CBC W/AUTO DIFF WBC: CPT

## 2024-05-06 PROCEDURE — G0378 HOSPITAL OBSERVATION PER HR: HCPCS

## 2024-05-06 PROCEDURE — 82948 REAGENT STRIP/BLOOD GLUCOSE: CPT

## 2024-05-06 PROCEDURE — 1200000000 HC SEMI PRIVATE

## 2024-05-06 PROCEDURE — 97116 GAIT TRAINING THERAPY: CPT

## 2024-05-06 PROCEDURE — 99221 1ST HOSP IP/OBS SF/LOW 40: CPT | Performed by: NURSE PRACTITIONER

## 2024-05-06 RX ORDER — BISACODYL 10 MG
10 SUPPOSITORY, RECTAL RECTAL ONCE
Status: COMPLETED | OUTPATIENT
Start: 2024-05-06 | End: 2024-05-06

## 2024-05-06 RX ORDER — BISACODYL 10 MG
10 SUPPOSITORY, RECTAL RECTAL DAILY PRN
Status: DISCONTINUED | OUTPATIENT
Start: 2024-05-06 | End: 2024-05-07 | Stop reason: HOSPADM

## 2024-05-06 RX ORDER — INSULIN GLARGINE 100 [IU]/ML
0.1 INJECTION, SOLUTION SUBCUTANEOUS NIGHTLY
Status: DISCONTINUED | OUTPATIENT
Start: 2024-05-06 | End: 2024-05-06

## 2024-05-06 RX ADMIN — BETHANECHOL CHLORIDE 25 MG: 25 TABLET ORAL at 15:39

## 2024-05-06 RX ADMIN — FERROUS SULFATE TAB 325 MG (65 MG ELEMENTAL FE) 325 MG: 325 (65 FE) TAB at 16:06

## 2024-05-06 RX ADMIN — GLIPIZIDE 10 MG: 10 TABLET ORAL at 16:06

## 2024-05-06 RX ADMIN — BUMETANIDE 0.5 MG: 0.5 TABLET ORAL at 10:55

## 2024-05-06 RX ADMIN — DOCUSATE SODIUM 100 MG: 100 CAPSULE, LIQUID FILLED ORAL at 10:54

## 2024-05-06 RX ADMIN — METHENAMINE HIPPURATE 1 G: 1 TABLET ORAL at 10:55

## 2024-05-06 RX ADMIN — CYCLOBENZAPRINE 10 MG: 10 TABLET, FILM COATED ORAL at 10:55

## 2024-05-06 RX ADMIN — GLIPIZIDE 10 MG: 10 TABLET ORAL at 06:31

## 2024-05-06 RX ADMIN — TAMSULOSIN HYDROCHLORIDE 0.4 MG: 0.4 CAPSULE ORAL at 20:19

## 2024-05-06 RX ADMIN — BETHANECHOL CHLORIDE 25 MG: 25 TABLET ORAL at 20:18

## 2024-05-06 RX ADMIN — METFORMIN HYDROCHLORIDE 1000 MG: 500 TABLET ORAL at 10:55

## 2024-05-06 RX ADMIN — LISINOPRIL 5 MG: 5 TABLET ORAL at 10:55

## 2024-05-06 RX ADMIN — ATENOLOL 50 MG: 50 TABLET ORAL at 10:55

## 2024-05-06 RX ADMIN — ALOGLIPTIN 6.25 MG: 6.25 TABLET, FILM COATED ORAL at 10:55

## 2024-05-06 RX ADMIN — BETHANECHOL CHLORIDE 25 MG: 25 TABLET ORAL at 10:55

## 2024-05-06 RX ADMIN — NALOXEGOL OXALATE 12.5 MG: 12.5 TABLET, FILM COATED ORAL at 14:36

## 2024-05-06 RX ADMIN — OXYCODONE HYDROCHLORIDE AND ACETAMINOPHEN 2 TABLET: 5; 325 TABLET ORAL at 20:18

## 2024-05-06 RX ADMIN — METHENAMINE HIPPURATE 1 G: 1 TABLET ORAL at 16:06

## 2024-05-06 RX ADMIN — FERROUS SULFATE TAB 325 MG (65 MG ELEMENTAL FE) 325 MG: 325 (65 FE) TAB at 10:55

## 2024-05-06 RX ADMIN — TAMSULOSIN HYDROCHLORIDE 0.4 MG: 0.4 CAPSULE ORAL at 10:55

## 2024-05-06 RX ADMIN — METFORMIN HYDROCHLORIDE 1000 MG: 500 TABLET ORAL at 16:06

## 2024-05-06 RX ADMIN — BISACODYL 10 MG: 10 SUPPOSITORY RECTAL at 11:02

## 2024-05-06 RX ADMIN — POLYETHYLENE GLYCOL 3350 17 G: 17 POWDER, FOR SOLUTION ORAL at 10:56

## 2024-05-06 RX ADMIN — SODIUM CHLORIDE, PRESERVATIVE FREE 10 ML: 5 INJECTION INTRAVENOUS at 10:55

## 2024-05-06 RX ADMIN — OXYCODONE HYDROCHLORIDE AND ACETAMINOPHEN 2 TABLET: 5; 325 TABLET ORAL at 10:54

## 2024-05-06 ASSESSMENT — PAIN DESCRIPTION - ORIENTATION
ORIENTATION: POSTERIOR;LOWER;MID
ORIENTATION: MID;LOWER

## 2024-05-06 ASSESSMENT — PAIN SCALES - GENERAL
PAINLEVEL_OUTOF10: 9
PAINLEVEL_OUTOF10: 10
PAINLEVEL_OUTOF10: 0
PAINLEVEL_OUTOF10: 9

## 2024-05-06 ASSESSMENT — PAIN DESCRIPTION - FREQUENCY: FREQUENCY: INTERMITTENT

## 2024-05-06 ASSESSMENT — PAIN DESCRIPTION - DESCRIPTORS
DESCRIPTORS: ACHING
DESCRIPTORS: SORE;ACHING;SHARP

## 2024-05-06 ASSESSMENT — PAIN DESCRIPTION - LOCATION
LOCATION: BACK
LOCATION: BACK

## 2024-05-06 ASSESSMENT — PAIN DESCRIPTION - ONSET: ONSET: ON-GOING

## 2024-05-06 ASSESSMENT — PAIN - FUNCTIONAL ASSESSMENT
PAIN_FUNCTIONAL_ASSESSMENT: ACTIVITIES ARE NOT PREVENTED
PAIN_FUNCTIONAL_ASSESSMENT: ACTIVITIES ARE NOT PREVENTED

## 2024-05-06 ASSESSMENT — PAIN DESCRIPTION - PAIN TYPE: TYPE: SURGICAL PAIN

## 2024-05-06 NOTE — PROGRESS NOTES
Department of Orthopedic Surgery  Spine Service  Progress Note        Subjective:   5/4/2024  Tawny is resting in the chair. Just completed 1 unit of blood. Critical value of Hgb 7.0 this morning. Will await repeat H&H s/p transfusion. Reports very tired and fatigued earlier, mild improvement. States she has continued improvement of BLE numbness sensation. Planning SNF, possible [facility].     5/5/2024  Tawny is resting in the chair. Reports fatigue today. Hgb 7.5. +flatus, no BM. Intermittent BLE numbness improving. Surgical pain overall controlled. Bilat calf pain noted, will order US bilat to r/o DVT.     5/6/24  Tawny is sitting in chair denies BM since admission. She is eating and drinking well. I have discussed with trying a suppository. Minimal drain output will remove drains today. She is interested in IPR will placed order for consultation.     Vitals  VITALS:  /67   Pulse 64   Temp 98.6 °F (37 °C) (Oral)   Resp 16   Ht 1.626 m (5' 4\")   Wt 106.9 kg (235 lb 10.8 oz)   SpO2 100%   BMI 40.45 kg/m²   24HR INTAKE/OUTPUT:    Intake/Output Summary (Last 24 hours) at 5/6/2024 0732  Last data filed at 5/6/2024 0430  Gross per 24 hour   Intake 240 ml   Output 30 ml   Net 210 ml     URINARY CATHETER OUTPUT (Disla):  [REMOVED] Urinary Catheter 05/01/24 Disla;2 Way-Output (mL): 450 mL  DRAIN/TUBE OUTPUT:  Closed/Suction Drain Inferior;Lateral;Left Back Accordion-Output (ml): 0 ml  Closed/Suction Drain Inferior;Lateral;Right Back Accordion-Output (ml): 0 ml  VENT SETTINGS:     Additional Respiratory Assessments  Pulse: 64  Respirations: 16  SpO2: 100 %      PHYSICAL EXAM:    Orientation:  alert and oriented to person, place and time    Incision:  dressing in place, clean, dry, and intact    Lower Extremity Motor :  quadriceps, extensor hallucis longus, dorsiflexion, plantarflexion 5/5 bilaterally  Lower Extremity Sensory:  Intact L1-S1    Flatus:  positive    ABNORMAL EXAM FINDINGS:  Bilat calf pain and  edema    LABS:  Recent Labs     05/06/24  0440   HGB 8.1*   HCT 25.2*       ASSESSMENT AND PLAN:    Post operative day 5    1:  Monitor labs and drain output. Remove drains and complete daily dressing changes   2:  Activity Level:  OOB with therapy and staff  3:  Pain Control:  controlled  4:  Discharge Planning:  Discharge pending clinical course. Planning SNF, possible [facility]. Consult IPR   5:  Bilat calf pain and edema; duplex US BLE to r/o DVT. Negative    Electronically signed by WILLIS Valenzuela CNP on 5/6/2024 at 7:32 AM

## 2024-05-06 NOTE — CARE COORDINATION
5/6/24, 9:00 AM EDT    DISCHARGE PLANNING EVALUATION    Updated referral to [facility] for review and precert if accepting.   Patient prefers IPR and consult has been placed by KT Valente

## 2024-05-06 NOTE — PROGRESS NOTES
[facility]  INPATIENT PHYSICAL THERAPY  DAILY NOTE  [facility] ORTHOPEDICS 7K - 7K-16/016-A      Time In: 0759  Time Out: 0845  Timed Code Treatment Minutes: 46 Minutes  Minutes: 46            Date: 2024  Patient Name: Tawny Rico,  Gender:  female        MRN: [id number]  : 1955  (69 y.o.)     Referring Practitioner: SUKH Matthews CNP  Diagnosis: spinal stenosis of lumbar region  Additional Pertinent Hx: The patient is 69 years of age and struggling with her symptoms of back and leg pain due to spinal stenosis.  She has adjacent level stenosis of L2-L3, L3-L4, and L5-S1 adjacent to a previous L4-L5 decompression and fusion performed in year , 16 years prior.  The patient has significant difficulty standing and walking. Pt underwent Removal of Hardware Lumbar 4-Lumbar 5, Lumbar 5 to Sacral 1 Posterior Latereal Interbody Fusion, Lumbar 2 to Lumbar 4 Laminectomy and Posterior Spinal Fusion with Local Bone Graft, Demineralized Bone Matrix and Instrumentation on  with Dr. Ortiz.     Prior Level of Function:  Lives With: Alone  Type of Home: Apartment (new senior living apartment)  Home Layout: One level  Home Access: Level entry  Home Equipment: Walker, rolling        Ambulation Assistance: Independent  Transfer Assistance: Independent  Active : Yes    Restrictions/Precautions:  Restrictions/Precautions: Fall Risk, Up as Tolerated  Required Braces or Orthoses  Spinal: Lumbar Corset  Spinal Other: per ortho spine PA, okay for abdominal binder until lumbar corset arrives  Position Activity Restriction  Spinal Precautions: No Bending, No Lifting, No Twisting       SUBJECTIVE: pt up in chair on arrival and reported that she got up at 1 this am due to unable to get comfortable in bed - pt agreeable for therapy, pt was incont of urine during session     PAIN: in back and buttocks - pt moving very slow and guarded - cues for breathing technique     Vitals: Vitals not assessed per clinical

## 2024-05-06 NOTE — PLAN OF CARE
Problem: Discharge Planning  Goal: Discharge to home or other facility with appropriate resources  5/5/2024 0851 by Frandy Oswlad RN  Outcome: Progressing  Flowsheets (Taken 5/5/2024 0851)  Discharge to home or other facility with appropriate resources:   Identify barriers to discharge with patient and caregiver   Identify discharge learning needs (meds, wound care, etc)   Refer to discharge planning if patient needs post-hospital services based on physician order or complex needs related to functional status, cognitive ability or social support system   Arrange for needed discharge resources and transportation as appropriate     Problem: Chronic Conditions and Co-morbidities  Goal: Patient's chronic conditions and co-morbidity symptoms are monitored and maintained or improved  5/5/2024 0851 by Frandy Oswald RN  Outcome: Progressing  Flowsheets (Taken 5/5/2024 0851)  Care Plan - Patient's Chronic Conditions and Co-Morbidity Symptoms are Monitored and Maintained or Improved:   Monitor and assess patient's chronic conditions and comorbid symptoms for stability, deterioration, or improvement   Collaborate with multidisciplinary team to address chronic and comorbid conditions and prevent exacerbation or deterioration   Update acute care plan with appropriate goals if chronic or comorbid symptoms are exacerbated and prevent overall improvement and discharge     Problem: Pain  Goal: Verbalizes/displays adequate comfort level or baseline comfort level  5/5/2024 2230 by Ev Chauhan RN  Outcome: Progressing  5/5/2024 0851 by Frandy Oswald RN  Outcome: Progressing  Flowsheets (Taken 5/5/2024 0851)  Verbalizes/displays adequate comfort level or baseline comfort level:   Encourage patient to monitor pain and request assistance   Administer analgesics based on type and severity of pain and evaluate response   Consider cultural and social influences on pain and pain management   Assess pain using appropriate pain

## 2024-05-06 NOTE — PROGRESS NOTES
[facility] ORTHOPEDICS 7K  Occupational Therapy  Daily Note  Time:   Time In: 1314  Time Out: 1342  Timed Code Treatment Minutes: 28 Minutes  Minutes: 28          Date: 2024  Patient Name: Tawny Rico,   Gender: female      Room: [facility]16/016-A  MRN: [id number]  : 1955  (69 y.o.)  Referring Practitioner: Gisel Matthews, WILLIS - CNP  Diagnosis: spinal stenosis of lumbar region , unspecified weather neurogenic claudication present  Additional Pertinent Hx: The patient is 69 years of age and struggling with her symptoms of back and leg pain due to spinal stenosis.  She has adjacent level stenosis of L2-L3, L3-L4, and L5-S1 adjacent to a previous L4-L5 decompression and fusion performed in year , 16 years prior.  The patient has significant difficulty standing and walking. Pt underwent Removal of Hardware Lumbar 4-Lumbar 5, Lumbar 5 to Sacral 1 Posterior Latereal Interbody Fusion, Lumbar 2 to Lumbar 4 Laminectomy and Posterior Spinal Fusion with Local Bone Graft, Demineralized Bone Matrix and Instrumentation on  with Dr. Ortiz.    Restrictions/Precautions:  Restrictions/Precautions: Fall Risk, Up as Tolerated  Required Braces or Orthoses  Spinal: Lumbar Corset  Spinal Other: per ortho spine PA, okay for abdominal binder until lumbar corset arrives  Position Activity Restriction  Spinal Precautions: No Bending, No Lifting, No Twisting     Social/Functional History:  Lives With: Alone  Type of Home: Apartment (new senior living apartment)  Home Layout: One level  Home Access: Level entry  Home Equipment: Walker, rolling           Ambulation Assistance: Independent  Transfer Assistance: Independent    Active : Yes          SUBJECTIVE: RN okayed OT session.  Pt.agreeable and pleasant to participate.      PAIN: low back -not rated    Vitals: Vitals not assessed per clinical judgement, see nursing flowsheet    COGNITION: Slow Processing, Decreased Insight, and Decreased Safety

## 2024-05-06 NOTE — PROGRESS NOTES
Physician Progress Note      PATIENT:               LINDSAY PORRAS  [facility] #:                  [id number]  :                       1955  ADMIT DATE:       2024 5:27 AM  DISCH DATE:  RESPONDING  PROVIDER #:        Giesl Horvath CNP          QUERY TEXT:    Pt admitted 24 with low back pain and underwent surgery on 24.  24 hemoglobin 8.9  -> low of 7.0 on 27. Acute on chronic normocytic   anemia is documented.  Pt treated with PRBC transfusion.    If possible, please document in progress notes and discharge summary if   evaluating and or treating:    The medical record reflects the following:  Risk Factors:  ?removal of hardware L4-L5, L5-S1 posterior lateral interbody   fusion, L2-L4 laminectomy and posterior spinal fusion with local bone graft,   demineralized bone matrix and instrumentation on 2024,  surgical EBL 1000   mL with return of Cell Saver of 385 mL, Hemovac x 2 - daily output beginning   with operative day in ml: [phone]  Clinical Indicators: tired, fatigued, admission Hgb 8.9 ->  low of 7.9 on   24  Treatment: transfusion 1 unit RBCs, lab monitoring    Aliza Guardado, MSN, RN, CCDS, CRCR  Clinical   .  Options provided:  -- Anemia due to acute blood loss  -- Anemia due to acute on chronic blood loss  -- Anemia due to iron deficiency  -- Anemia due to postoperative blood loss  -- Dilutional anemia  -- Other - I will add my own diagnosis  -- Disagree - Not applicable / Not valid  -- Disagree - Clinically unable to determine / Unknown  -- Refer to Clinical Documentation Reviewer    PROVIDER RESPONSE TEXT:    This patient has acute blood loss anemia.    Query created by: Aliza Guardado on 2024 11:40 AM      Electronically signed by:  Gisel Horvath CNP 2024 1:45 PM

## 2024-05-06 NOTE — CONSULTS
Physical Medicine & Rehabilitation   Consult Note      Admitting Physician: Gentry Ortiz MD    Primary Care Provider: Gregg Horne MD     Reason for Consult:  lumbar stenosis. Rehab needs    History of Present Illness:  Tawny Rico is a 69 y.o. female admitted to [facility] on 5/1/2024. Patient  has a past medical history of Arthritis, Diabetes mellitus (HCC), Hypertension, and UTI (urinary tract infection). \"Patient was admitted under Dr. Ortiz service for lower back pain for which she underwent removal of hardware L4-L5, L5-S1 posterior lateral interbody fusion, L2-L4 laminectomy and posterior spinal fusion with local bone graft, demineralized bone matrix and instrumentation on 5/1/2024. Postoperatively, she was noted to be hyperglycemic in the 300s, thus medicine were consulted for diabetes management.  Patient doing well on visit.  Denies any fever or chills.  No chest pain or shortness of breath.  No abdominal pain, nausea or vomiting.  No issues with urination.  Reports not having bowel movement since 4/30.  Also reports her sugars usually run in 150s at home and attributes hyperglycemia to not getting her home medications.\"  Patient had a drop in Hgb post op, requiring 1 unit PRBC. Patient continued with high dose SSI coverage for hyperglycemia. Patient was also treated for constipation.     5/6/24: Patient seen today in consult. Patient up in chair. Patient states she was up to bathroom with therapy today. Patient uncomfortable in chair, sitting up. Patient hasn't had pain medication today, has been taking them regularly. Patient with complaint sof constipation. Denies significant abdominal pain. Discussed suppository was ordered, also added movantik while on percocet. BS diminished, encouraged suppository today. Discussed IPR vs SNF. Insurance restrictions and qualifications for IPR. Explained more appropriate for SNF, patient understanding.     Current Rehabilitation  pleasure to evaluate Tawny Rico today.  Please call with questions.    Lisbeth Bryant, APRN - CNP

## 2024-05-06 NOTE — CARE COORDINATION
5/6/24, 2:52 PM EDT    DISCHARGE ON GOING EVALUATION    Tawny LUZ Jacky       [facility] day: 0  Location: 7K-16/016-A Reason for admit: Spinal stenosis of lumbar region, unspecified whether neurogenic claudication present [M48.061]  Spinal stenosis of lumbar region with neurogenic claudication [M48.062]  Lumbar stenosis with neurogenic claudication [M48.062]     Procedures:   5/1 Removal of HW 4-Lumbar 5, Lumbar 5 to Sacral 1 Posterior Latereal Interbody Fusion, Lumbar 2 to Lumbar 4 Laminectomy and Posterior Spinal Fusion with Local Bone Graft, Demineralized Bone Matrix and Instrumentation with EBL 1000 mL with return of Cell Saver of 385 mL.   5/4 1 unit PRBC     Imaging since last note:   5/5 BLE Venous DUP: Negative     Barriers to Discharge: Ortho and Hospitalist following. PM&R signed off. PT/OT. H&H q6h. Minimal drain output- plan for removal today. PO pain control. Bowel regimen. 1x suppository today.    Latest Reference Range & Units 05/04/24 13:04 05/05/24 03:40 05/05/24 16:11 05/05/24 23:00 05/06/24 04:40 05/06/24 10:35   Hemoglobin Quant 12.0 - 16.0 gm/dl 8.3 (L) 7.5 (L) 8.0 (L) 8.0 (L) 8.1 (L) 8.0 (L)       PCP: Gregg Horne MD  Readmission Risk Score: 13    Patient Goals/Plan/Treatment Preferences: [facility]PR denied. [facility]. Spoke w/ Tawny and her daughter Rose- Plan for [facility] started today. Daughter will transport. SW following.

## 2024-05-06 NOTE — CARE COORDINATION
5/6/24, 9:48 AM EDT    DISCHARGE PLANNING EVALUATION    Spoke with [facility] admissions, no bed is available.   Spoke with [facility] admissions, sent updated chart information for precert.    IPR has declined.      Discussed with patient who is in agreement with [facility]

## 2024-05-06 NOTE — PROGRESS NOTES
Comprehensive Nutrition Assessment    Type and Reason for Visit:  Initial, Positive Nutrition Screen, Wound    Nutrition Recommendations/Plan:   Continue CHO control diet  Start ONS: Glucerna (BID) and assess tolerance  Continue ONS: Activia and hot beverage (TID) and encourage pt to eat to assist with BM motivation  Recommend MVI to assist with wound healing efforts     Malnutrition Assessment:  Malnutrition Status:  At risk for malnutrition (Comment) (05/06/24 1143)    Context:  Chronic Illness     Findings of the 6 clinical characteristics of malnutrition:  Energy Intake:  Mild decrease in energy intake (Comment) (varies per pt report)  Weight Loss:  Unable to assess (pt states she has but states she also will gain - appears wt fluctuates)     Body Fat Loss:  No significant body fat loss     Muscle Mass Loss:  No significant muscle mass loss    Fluid Accumulation:  Mild Extremities   Strength:  Not Performed    Nutrition Assessment:     Pt. nutritionally compromised AEB wounds.  At risk for further nutrition compromise r/t s/p removal of hardware L4-L5, L5-S1 posterior lateral interbody fusion, L2-L4 laminectomy and posterior spinal fusion with local bone graft, demineralized bone matrix and instrumentation on 5/1/2024, T2DM with hyperglycemia, constipation, recurrent UTI, increased nutrient needs for wound healing, underlying medical condition (PMHx: arthritis, DM, HTN).       Nutrition Related Findings:    Pt. Report/Treatments/Miscellaneous: Pt seen, endorses decreased appetite and PO intake on and off for some time now. Pt states that her appetite loss is r/t pain - she has had chronically for years. She states that it could also be contributed to constipation. Pt states that her weight fluctuates frequently - she often finds that she has lost or gained weight. Pt states that she needs her thyroid out and got emotional regarding topics. Encouraged protein to assist with wound healing efforts and

## 2024-05-06 NOTE — PROGRESS NOTES
Hospitalist Progress Note    Patient:  Tawny Rico      Unit/Bed:7K-16/016-A    YOB: 1955    MRN: [id number]       Acct: [id number]     PCP: Gregg Horne MD    Date of Admission: 5/1/2024    Assessment/Plan:    Lower back pain s/p decompression and fusion in 2008, status post removal of hardware L4-L5, L5-S1 posterior lateral interbody fusion, L2-L4 laminectomy and posterior spinal fusion with local bone graft, demineralized bone matrix and instrumentation on 5/1/2024 with Dr. Ortiz:   -Management per primary, Dr. Ortiz. POD #5  -EBL 1000cc  -Hospitalist consulted due to hypoglycemia with history of T2DM                Diabetes mellitus type 2 with hyperglycemia: Well controlled  Hemoglobin A1c 7.2(5/3).  POCT glucose trend improved.    -On home medications including metformin and glipizide, formulary alternative for home Januvia.    -Continue high-dose SSI  -Continue POCT glucose checks AC & HS  -Hypoglycemia protocol     Acute on chronic normocytic anemia:               -Hemoglobin 7.5 5/5/24, status post 1 unit PRBC  -Trend CBC PRN     Lightheaded:   -Orthostatic vitals negative, suspect secondary to pain medication vs anemia  -Fall precautions.       Constipation:   -Continue bowel regimen     Hypertension:   Stable. Continue home antihypertensives with hold parameters.    Recurrent UTI:   Denies any symptoms currently.  On chronic Hip-Kelvin, will continue    Urinary retention:   Continue home bethanechol and Tamsulosin    Allergies: Zyrtec as needed    Chief Complaint: \"Low back pain secondary to lumbar spinal stenosis\"       Subjective: 69 y.o. female admitted to the hospitalist service for low back pain. Patient rates her back pain a 9/10 in severity. She denies chest pain. She denies nausea or vomiting. She denies spasms.    Medications:    Infusion Medications    sodium chloride      dextrose       Scheduled Medications    bisacodyl  10 mg Rectal Once    bumetanide  0.5 mg Oral

## 2024-05-07 VITALS
TEMPERATURE: 98 F | OXYGEN SATURATION: 97 % | HEIGHT: 64 IN | SYSTOLIC BLOOD PRESSURE: 128 MMHG | RESPIRATION RATE: 18 BRPM | DIASTOLIC BLOOD PRESSURE: 55 MMHG | WEIGHT: 235.67 LBS | BODY MASS INDEX: 40.23 KG/M2 | HEART RATE: 84 BPM

## 2024-05-07 LAB
GLUCOSE BLD STRIP.AUTO-MCNC: 115 MG/DL (ref 70–108)
GLUCOSE BLD STRIP.AUTO-MCNC: 174 MG/DL (ref 70–108)
HCT VFR BLD AUTO: 24.5 % (ref 37–47)
HCT VFR BLD AUTO: 24.6 % (ref 37–47)
HGB BLD-MCNC: 7.9 GM/DL (ref 12–16)
HGB BLD-MCNC: 8 GM/DL (ref 12–16)

## 2024-05-07 PROCEDURE — 97530 THERAPEUTIC ACTIVITIES: CPT

## 2024-05-07 PROCEDURE — 6370000000 HC RX 637 (ALT 250 FOR IP): Performed by: NURSE PRACTITIONER

## 2024-05-07 PROCEDURE — 82948 REAGENT STRIP/BLOOD GLUCOSE: CPT

## 2024-05-07 PROCEDURE — 85018 HEMOGLOBIN: CPT

## 2024-05-07 PROCEDURE — 97535 SELF CARE MNGMENT TRAINING: CPT

## 2024-05-07 PROCEDURE — 36415 COLL VENOUS BLD VENIPUNCTURE: CPT

## 2024-05-07 PROCEDURE — 6370000000 HC RX 637 (ALT 250 FOR IP): Performed by: STUDENT IN AN ORGANIZED HEALTH CARE EDUCATION/TRAINING PROGRAM

## 2024-05-07 PROCEDURE — 97110 THERAPEUTIC EXERCISES: CPT

## 2024-05-07 PROCEDURE — 85014 HEMATOCRIT: CPT

## 2024-05-07 PROCEDURE — 99231 SBSQ HOSP IP/OBS SF/LOW 25: CPT | Performed by: PHYSICIAN ASSISTANT

## 2024-05-07 PROCEDURE — 97116 GAIT TRAINING THERAPY: CPT

## 2024-05-07 RX ORDER — OXYCODONE HYDROCHLORIDE AND ACETAMINOPHEN 5; 325 MG/1; MG/1
1 TABLET ORAL EVERY 4 HOURS PRN
Qty: 84 TABLET | Refills: 0 | Status: SHIPPED | OUTPATIENT
Start: 2024-05-07 | End: 2024-05-21

## 2024-05-07 RX ORDER — PSEUDOEPHEDRINE HCL 30 MG
100 TABLET ORAL 2 TIMES DAILY PRN
Qty: 60 CAPSULE | Refills: 0 | Status: SHIPPED | OUTPATIENT
Start: 2024-05-07

## 2024-05-07 RX ORDER — CYCLOBENZAPRINE HCL 10 MG
10 TABLET ORAL 3 TIMES DAILY PRN
Qty: 50 TABLET | Refills: 0 | Status: SHIPPED | OUTPATIENT
Start: 2024-05-07 | End: 2024-05-24

## 2024-05-07 RX ADMIN — METFORMIN HYDROCHLORIDE 1000 MG: 500 TABLET ORAL at 08:42

## 2024-05-07 RX ADMIN — ALOGLIPTIN 6.25 MG: 6.25 TABLET, FILM COATED ORAL at 08:42

## 2024-05-07 RX ADMIN — BUMETANIDE 0.5 MG: 0.5 TABLET ORAL at 08:42

## 2024-05-07 RX ADMIN — BETHANECHOL CHLORIDE 25 MG: 25 TABLET ORAL at 08:42

## 2024-05-07 RX ADMIN — ATENOLOL 50 MG: 50 TABLET ORAL at 08:42

## 2024-05-07 RX ADMIN — METHENAMINE HIPPURATE 1 G: 1 TABLET ORAL at 08:41

## 2024-05-07 RX ADMIN — OXYCODONE HYDROCHLORIDE AND ACETAMINOPHEN 2 TABLET: 5; 325 TABLET ORAL at 14:54

## 2024-05-07 RX ADMIN — TAMSULOSIN HYDROCHLORIDE 0.4 MG: 0.4 CAPSULE ORAL at 08:42

## 2024-05-07 RX ADMIN — FERROUS SULFATE TAB 325 MG (65 MG ELEMENTAL FE) 325 MG: 325 (65 FE) TAB at 08:42

## 2024-05-07 RX ADMIN — BETHANECHOL CHLORIDE 25 MG: 25 TABLET ORAL at 14:54

## 2024-05-07 RX ADMIN — OXYCODONE HYDROCHLORIDE AND ACETAMINOPHEN 2 TABLET: 5; 325 TABLET ORAL at 10:03

## 2024-05-07 RX ADMIN — GLIPIZIDE 10 MG: 10 TABLET ORAL at 05:19

## 2024-05-07 RX ADMIN — LISINOPRIL 5 MG: 5 TABLET ORAL at 08:42

## 2024-05-07 RX ADMIN — OXYCODONE HYDROCHLORIDE AND ACETAMINOPHEN 2 TABLET: 5; 325 TABLET ORAL at 05:23

## 2024-05-07 ASSESSMENT — PAIN DESCRIPTION - PAIN TYPE
TYPE: SURGICAL PAIN
TYPE: SURGICAL PAIN

## 2024-05-07 ASSESSMENT — PAIN SCALES - GENERAL
PAINLEVEL_OUTOF10: 3
PAINLEVEL_OUTOF10: 3
PAINLEVEL_OUTOF10: 6
PAINLEVEL_OUTOF10: 8
PAINLEVEL_OUTOF10: 8

## 2024-05-07 ASSESSMENT — PAIN DESCRIPTION - FREQUENCY: FREQUENCY: INTERMITTENT

## 2024-05-07 ASSESSMENT — PAIN DESCRIPTION - DESCRIPTORS
DESCRIPTORS: ACHING
DESCRIPTORS: ACHING
DESCRIPTORS: BURNING

## 2024-05-07 ASSESSMENT — PAIN DESCRIPTION - ORIENTATION
ORIENTATION: MID;LOWER
ORIENTATION: MID;LOWER
ORIENTATION: POSTERIOR

## 2024-05-07 ASSESSMENT — PAIN DESCRIPTION - LOCATION
LOCATION: BACK

## 2024-05-07 ASSESSMENT — PAIN DESCRIPTION - ONSET: ONSET: ON-GOING

## 2024-05-07 NOTE — PROGRESS NOTES
Department of Orthopedic Surgery  Spine Service  Progress Note        Subjective:   5/4/2024  Tawny is resting in the chair. Just completed 1 unit of blood. Critical value of Hgb 7.0 this morning. Will await repeat H&H s/p transfusion. Reports very tired and fatigued earlier, mild improvement. States she has continued improvement of BLE numbness sensation. Planning SNF, possible [facility].     5/5/2024  Tawny is resting in the chair. Reports fatigue today. Hgb 7.5. +flatus, no BM. Intermittent BLE numbness improving. Surgical pain overall controlled. Bilat calf pain noted, will order US bilat to r/o DVT.     5/6/24  Tawny is sitting in chair denies BM since admission. She is eating and drinking well. I have discussed with trying a suppository. Minimal drain output will remove drains today. She is interested in IPR will placed order for consultation.     5/7/24  Tawny is resting in bed. Overall doing well. IPR denies. +BM. Drains removed yesterday. Planning [place] [facility], await precert.     Vitals  VITALS:  BP (!) 111/58   Pulse 74   Temp 99.1 °F (37.3 °C) (Oral)   Resp 18   Ht 1.626 m (5' 4\")   Wt 106.9 kg (235 lb 10.8 oz)   SpO2 99%   BMI 40.45 kg/m²   24HR INTAKE/OUTPUT:    Intake/Output Summary (Last 24 hours) at 5/7/2024 0645  Last data filed at 5/6/2024 1955  Gross per 24 hour   Intake 980 ml   Output 150 ml   Net 830 ml     URINARY CATHETER OUTPUT (Disla):  [REMOVED] Urinary Catheter 05/01/24 Disla;2 Way-Output (mL): 450 mL  DRAIN/TUBE OUTPUT:  [REMOVED] Closed/Suction Drain Inferior;Lateral;Left Back Accordion-Output (ml): 90 ml  [REMOVED] Closed/Suction Drain Inferior;Lateral;Right Back Accordion-Output (ml): 60 ml  VENT SETTINGS:     Additional Respiratory Assessments  Pulse: 74  Respirations: 18  SpO2: 99 %      PHYSICAL EXAM:    Orientation:  alert and oriented to person, place and time    Incision:  dressing in place, clean, dry, and intact    Lower Extremity Motor :  quadriceps, extensor hallucis  longus, dorsiflexion, plantarflexion 5/5 bilaterally  Lower Extremity Sensory:  Intact L1-S1    Flatus:  positive    ABNORMAL EXAM FINDINGS:  Bilat calf pain and edema    LABS:  Recent Labs     05/07/24  0410   HGB 8.0*   HCT 24.6*       ASSESSMENT AND PLAN:    Post operative day 6    1:  Monitor labs and drain output. Remove drains and complete daily dressing changes   2:  Activity Level:  OOB with therapy and staff  3:  Pain Control:  controlled  4:  Discharge Planning:  Discharge pending clinical course. Planning [facility]. Await precert  5:  Bilat calf pain and edema; duplex US BLE to r/o DVT. Negative    Electronically signed by David Villanueva PA-C on 5/7/2024 at 6:45 AM

## 2024-05-07 NOTE — PROGRESS NOTES
[facility] ORTHOPEDICS 7K  Occupational Therapy  Daily Note  Time:   Time In: 1028  Time Out: 1111  Timed Code Treatment Minutes: 43 Minutes  Minutes: 43          Date: 2024  Patient Name: Tawny Rico,   Gender: female      Room: [facility]16/016-A  MRN: [id number]  : 1955  (69 y.o.)  Referring Practitioner: Gisel Matthews, WILLIS - CNP  Diagnosis: spinal stenosis of lumbar region , unspecified weather neurogenic claudication present  Additional Pertinent Hx: The patient is 69 years of age and struggling with her symptoms of back and leg pain due to spinal stenosis.  She has adjacent level stenosis of L2-L3, L3-L4, and L5-S1 adjacent to a previous L4-L5 decompression and fusion performed in year , 16 years prior.  The patient has significant difficulty standing and walking. Pt underwent Removal of Hardware Lumbar 4-Lumbar 5, Lumbar 5 to Sacral 1 Posterior Latereal Interbody Fusion, Lumbar 2 to Lumbar 4 Laminectomy and Posterior Spinal Fusion with Local Bone Graft, Demineralized Bone Matrix and Instrumentation on  with Dr. Ortiz.    Restrictions/Precautions:  Restrictions/Precautions: Fall Risk, Up as Tolerated  Required Braces or Orthoses  Spinal: Lumbar Corset  Spinal Other: per ortho spine PA, okay for abdominal binder until lumbar corset arrives  Position Activity Restriction  Spinal Precautions: No Bending, No Lifting, No Twisting     Social/Functional History:  Lives With: Alone  Type of Home: Apartment (new senior living apartment)  Home Layout: One level  Home Access: Level entry  Home Equipment: Walker, rolling           Ambulation Assistance: Independent  Transfer Assistance: Independent    Active : Yes          SUBJECTIVE: RN approved. Pt pleasant and agreeable to OT session.     PAIN: c/o     Vitals: Vitals not assessed per clinical judgement, see nursing flowsheet    COGNITION: Decreased Problem Solving and Decreased Safety Awareness    ADL:   Bathing: Minimal

## 2024-05-07 NOTE — PROGRESS NOTES
Hospitalist Progress Note    Patient:  Tawny Rico      Unit/Bed:7K-16/016-A    YOB: 1955    MRN: [id number]       Acct: [id number]     PCP: Gregg Horne MD    Date of Admission: 5/1/2024    Assessment/Plan:    Lower back pain s/p decompression and fusion in 2008, status post removal of hardware L4-L5, L5-S1 posterior lateral interbody fusion, L2-L4 laminectomy and posterior spinal fusion with local bone graft, demineralized bone matrix and instrumentation on 5/1/2024 with Dr. Ortiz:   -Management per primary, Dr. Ortiz. POD #5  -EBL 1000cc  -Hospitalist consulted due to hypoglycemia with history of T2DM                Diabetes mellitus type 2 with hyperglycemia: Well controlled  Hemoglobin A1c 7.2(5/3).  POCT glucose trend improved.    -On home medications including metformin and glipizide, formulary alternative for home Januvia.    -Continue high-dose SSI  -Continue POCT glucose checks AC & HS  -Hypoglycemia protocol     Acute on chronic normocytic anemia:               -Hemoglobin 7.5 5/5/24, status post 1 unit PRBC  -Trend CBC PRN  -Hgb 7.9 today, continue to trend, recommend transfusion for Hgb less than 7     Lightheaded:   -Orthostatic vitals negative, suspect secondary to pain medication vs anemia  -Fall precautions.       Constipation:   -Continue bowel regimen     Hypertension:   Stable. Continue home antihypertensives with hold parameters.    Recurrent UTI:   Denies any symptoms currently.  On chronic Hip-Kelvin, will continue    Urinary retention:   Continue home bethanechol and Tamsulosin    Allergies: Zyrtec as needed    Patient stable for discharge from hospitalist perspective once SNF is able to accept, await pre-cert. Hospitalist service will sign off at this time. Thank you for the consultation and allowing us to partake in the care of this patient. Please feel free to contact us with any questions or concerns.    Chief Complaint: \"Low back pain secondary to lumbar spinal  Mental Status: She is alert.   Psychiatric:         Speech: She is communicative.        Labs:   Recent Labs     05/05/24  0340 05/05/24  1611 05/06/24  0440 05/06/24  1035 05/06/24  2230 05/07/24  0410 05/07/24  1022   WBC 9.1  --  8.0  --   --   --   --    HGB 7.5*   < > 8.1*   < > 8.3* 8.0* 7.9*   HCT 22.8*   < > 25.2*   < > 25.2* 24.6* 24.5*     --  239  --   --   --   --     < > = values in this interval not displayed.       Recent Labs     05/05/24  0340 05/06/24  0440   * 131*   K 4.9 4.3    99   CO2 25 23   BUN 32* 32*   CREATININE 0.9 0.9   CALCIUM 9.3 10.0       No results for input(s): \"AST\", \"ALT\", \"BILIDIR\", \"BILITOT\", \"ALKPHOS\" in the last 72 hours.  No results for input(s): \"INR\" in the last 72 hours.  No results for input(s): \"CKTOTAL\", \"TROPONINI\" in the last 72 hours.    Urinalysis:    No results found for: \"NITRU\", \"WBCUA\", \"BACTERIA\", \"RBCUA\", \"BLOODU\", \"SPECGRAV\", \"GLUCOSEU\"    Radiology:  Vascular duplex lower extremity venous bilateral   Final Result   Normal venous ultrasound. No evidence for deep venous thrombosis.            **This report has been created using voice recognition software.  It may contain minor errors which are inherent in voice recognition technology.**      Final report electronically signed by Dr. Gibson White on 5/5/2024 2:57 PM      XR LUMBAR SPINE (2-3 VIEWS)   Final Result   Postop appearance lumbar spine.            **This report has been created using voice recognition software.  It may contain minor errors which are inherent in voice recognition technology.**      Final report electronically signed by Dr. Gibson White on 5/1/2024 1:17 PM      XR LUMBAR SPINE 1 VW   Final Result   Intraoperative  appearance of the lumbar spine.            **This report has been created using voice recognition software.  It may contain minor errors which are inherent in voice recognition technology.**      Final report electronically signed by Dr. Gibson White

## 2024-05-07 NOTE — PROGRESS NOTES
[facility]  INPATIENT PHYSICAL THERAPY  DAILY NOTE  [facility] ORTHOPEDICS 7K - 7K-16/016-A      Time In: 09  Time Out: 0959  Timed Code Treatment Minutes: 38 Minutes  Minutes: 38          Date: 2024  Patient Name: Tawny Rico,  Gender:  female        MRN: [id number]  : 1955  (69 y.o.)     Referring Practitioner: SUKH Matthews CNP  Diagnosis: spinal stenosis of lumbar region  Additional Pertinent Hx: The patient is 69 years of age and struggling with her symptoms of back and leg pain due to spinal stenosis.  She has adjacent level stenosis of L2-L3, L3-L4, and L5-S1 adjacent to a previous L4-L5 decompression and fusion performed in year , 16 years prior.  The patient has significant difficulty standing and walking. Pt underwent Removal of Hardware Lumbar 4-Lumbar 5, Lumbar 5 to Sacral 1 Posterior Latereal Interbody Fusion, Lumbar 2 to Lumbar 4 Laminectomy and Posterior Spinal Fusion with Local Bone Graft, Demineralized Bone Matrix and Instrumentation on  with Dr. Ortiz.     Prior Level of Function:  Lives With: Alone  Type of Home: Apartment (new senior living apartment)  Home Layout: One level  Home Access: Level entry  Home Equipment: Walker, rolling        Ambulation Assistance: Independent  Transfer Assistance: Independent  Active : Yes    Restrictions/Precautions:  Restrictions/Precautions: Fall Risk, Up as Tolerated  Required Braces or Orthoses  Spinal: Lumbar Corset  Spinal Other: per ortho spine PA, okay for abdominal binder until lumbar corset arrives  Position Activity Restriction  Spinal Precautions: No Bending, No Lifting, No Twisting       SUBJECTIVE: pt agreeable for therapy needing to use bathroom during session    PAIN: back pain greater on left side vs right side- no number given, pt denied pain or numbness or tingling in her LEs     Vitals: Vitals not assessed per clinical judgement, see nursing flowsheet    OBJECTIVE:  Bed Mobility:  Rolling to Left: Minimal  Patient  Patient Education: Plan of Care    Goals:  Patient Goals : feel better  Short Term Goals  Time Frame for Short Term Goals: by discharge  Short Term Goal 1: bed mobility with CGA to get in/out of bed, use log roll technique  Short Term Goal 2: transfer with CGA to get in/out of chairs  Short Term Goal 3: amb 25'x1 with walker and CGA to walk safely in room  Long Term Goals  Time Frame for Long Term Goals : no LTGs set secondary to short ELOS    Following session, patient left in safe position with all fall risk precautions in place.

## 2024-05-07 NOTE — CARE COORDINATION
5/7/24, 10:06 AM EDT    DISCHARGE PLANNING EVALUATION    [facility]ert for [facility] approved, can accept today.  Confirmed with daughter, who plans to transport.      5/7/24, 11:28 AM EDT    Patient goals/plan/ treatment preferences discussed by  and .  Patient goals/plan/ treatment preferences reviewed with patient/ family.  Patient/ family verbalize understanding of discharge plan and are in agreement with goal/plan/treatment preferences.  Understanding was demonstrated using the teach back method.  AVS provided by RN at time of discharge, which includes all necessary medical information pertaining to the patients current course of illness, treatment, post-discharge goals of care, and treatment preferences.     Services At/After Discharge: Skilled Nursing Facility (SNF)

## 2024-05-07 NOTE — DISCHARGE INSTR - COC
Continuity of Care Form    Patient Name: Tawny Rico   :  1955  MRN:  [id number]    Admit date:  2024  Discharge date:  2024    Code Status Order: Full Code   Advance Directives:   Advance Care Flowsheet Documentation       Date/Time Healthcare Directive Type of Healthcare Directive Copy in Chart Healthcare Agent Appointed Healthcare Agent's Name Healthcare Agent's Phone Number    [phone] No, patient does not have an advance directive for healthcare treatment -- -- -- -- --            Admitting Physician:  Gentry Ortiz MD  PCP: Gregg Horne MD    Discharging Nurse: Claudia SERNA  Discharging Hospital Unit/Room#: 7K-16/016-A  Discharging Unit Phone Number: [phone]    Emergency Contact:   Extended Emergency Contact Information  Primary Emergency Contact: Rose Fletcher  Mobile Phone: [phone]  Relation: Child  Preferred language: English    Past Surgical History:  Past Surgical History:   Procedure Laterality Date    LUMBAR FUSION N/A 2024    Removal of Hardware Lumbar 4-Lumbar 5, Lumbar 5 to Sacral 1 Posterior Latereal Interbody Fusion, Lumbar 2 to Lumbar 4 Laminectomy and Posterior Spinal Fusion with Local Bone Graft, Demineralized Bone Matrix and Instrumentation performed by Gentry Ortiz MD at [facility] OR       Immunization History:   Immunization History   Administered Date(s) Administered    Zoster Live (Zostavax) 2012       Active Problems:  Patient Active Problem List   Diagnosis Code    Spinal stenosis of lumbar region with neurogenic claudication M48.062    Controlled type 2 diabetes mellitus with hyperglycemia, without long-term current use of insulin (HCC) E11.65    Lightheaded R42    Constipation K59.00    Primary hypertension I10    Recurrent UTI N39.0    Lumbar stenosis with neurogenic claudication M48.062       Isolation/Infection:   Isolation            Contact          Patient Infection Status       Infection Onset Added Last Indicated Last Indicated By

## 2024-05-07 NOTE — PLAN OF CARE
Problem: Discharge Planning  Goal: Discharge to home or other facility with appropriate resources  Outcome: Completed     Problem: Chronic Conditions and Co-morbidities  Goal: Patient's chronic conditions and co-morbidity symptoms are monitored and maintained or improved  Outcome: Completed     Problem: Pain  Goal: Verbalizes/displays adequate comfort level or baseline comfort level  Outcome: Completed     Problem: Safety - Adult  Goal: Free from fall injury  Outcome: Completed     Problem: Safety - Adult  Goal: Isolation precautions  Description: Isolation precautions  Outcome: Completed     Problem: Skin/Tissue Integrity - Adult  Goal: Skin integrity remains intact  Outcome: Completed  Flowsheets (Taken 5/7/2024 1056)  Skin Integrity Remains Intact: Monitor for areas of redness and/or skin breakdown     Problem: Skin/Tissue Integrity - Adult  Goal: Incisions, wounds, or drain sites healing without S/S of infection  Outcome: Completed     Problem: Musculoskeletal - Adult  Goal: Return mobility to safest level of function  Outcome: Completed     Problem: Genitourinary - Adult  Goal: Absence of urinary retention  Outcome: Completed     Problem: Hematologic - Adult  Goal: Maintains hematologic stability  Outcome: Completed     Problem: ABCDS Injury Assessment  Goal: Absence of physical injury  Outcome: Completed     Problem: Skin/Tissue Integrity  Goal: Absence of new skin breakdown  Description: 1.  Monitor for areas of redness and/or skin breakdown  2.  Assess vascular access sites hourly  3.  Every 4-6 hours minimum:  Change oxygen saturation probe site  4.  Every 4-6 hours:  If on nasal continuous positive airway pressure, respiratory therapy assess nares and determine need for appliance change or resting period.  Outcome: Completed     Problem: Nutrition Deficit:  Goal: Optimize nutritional status  Outcome: Completed   Care plan reviewed with patient.  Patient verbalize understanding of the plan of care and

## 2024-05-08 NOTE — H&P
[address] [address]                             PREOPERATIVE H&P        PATIENT NAME:        LINDSAY PORRAS                :       1955  MED REC NO:            [id number]                       ROOM:        ACCOUNT NO:           [id number]                       ADMIT DATE:       2024  PROVIDER:    Gisel Matthews CNP        DATE OF SURGERY:  A patient of Dr. Gentry Ortiz, undergoing surgery on the date of 2024, at [facility] for removal of hardware L4-L5, L2 to L4, L5-S1 laminectomy, L2 to S1 PSF, L5-S1 PLIF.     CHIEF COMPLAINT:  Lower back pain and bilateral lower extremity radiculopathy.     HISTORY OF PRESENT ILLNESS:  The patient is a 69-year-old female who presents to the office with ongoing complaints of struggling with symptoms of back pain and severe limitation of her mobility.  She is a patient who has been through previous L4-L5 laminectomy with posterior spinal fusion completed in  by Dr. Dang in which she overall did very well until the past year in which her symptoms have progressively worsened in which she is experiencing radicular symptoms of pain traveling into her anterior thighs much worse on the left-hand side.  She continues to decline with her overall condition and overall mobility despite undergoing conservative treatment with formal physical therapy, activity modifications, and over-the-counter NSAIDs.  She is unable to stand, about 5-10 minutes at a time before requiring to sit and rest.  She feels her symptoms have decreased her quality of life along with putting a hindrance in her day-to-day ability with completing her ADLs.  Due to ongoing symptoms and failed conservative treatment, she has decided to proceed with definitive treatment with surgical intervention with Dr. Gentry Ortiz.  She has been cleared by her PCP, Dr. Horne.     ALLERGIES:  NO KNOWN DRUG ALLERGIES.        PAST MEDICAL  HISTORY:  Hypertension, diabetes.     CURRENT MEDICATIONS:  Januvia, aspirin, methamphetamine, cranberry tablets, bethanechol, vitamin B12, probiotic, multivitamin, Advil, atenolol, lisinopril, metformin, glipizide.     PAST SURGICAL HISTORY:  C5-C6 fusion on 03/22/1998; L4-L5 fusion in 2008 by Dr. Dang; and in 2015, right knee replacement.     SOCIAL HISTORY:  The patient is a nonsmoker.  She lives alone.  She is retired.     REVIEW OF SYSTEMS:  GENERAL: Denies fever, fatigue, or chills.  RESPIRATORY: Denies shortness of breath, productive cough, or wheezing.  CARDIAC: Denies chest pain, palpitations, or leg swelling.  GI: Denies nausea, vomiting, or abdominal pain.  : Denies dysuria or incontinence.  NEURO: Denies seizures, blackouts, or fainting.  MUSCULOSKELETAL: Complains of low back pain and bilateral lower extremity radiculopathy, left greater than right.     PHYSICAL EXAMINATION:  VITAL SIGNS: Height 5 feet, weight 219 pounds.  GENERAL: The patient is pleasant, cooperative, alert, and oriented; sitting in a chair, in no acute distress.  She ambulates unassisted.  No open wounds or lesions are noted.  Midline scar is present.  EXTREMITIES: Bilateral lower extremities; skin is warm, dry, and intact.  Pulses +2 at the ankles bilaterally.  Calves are nontender without evidence of DVT.  She maintains +2 pulses in her posterior tibial artery bilaterally.  Strength exam does show loss of strength with bilateral hip flexors 4/5, able to push and pull maintain 5/5 as well as knee extension.  Normal sensation.     IMAGING:  MRI of lumbar spine completed on the date of 10/05/2023, at [facility].  This demonstrates findings consistent with posterior changes at the L4-L5 laminectomy and posterior fixation.  L5-S1 loss of disk height with severe spinal stenosis and severe bilateral foraminal narrowing.  At L3-L4, grade 1 spondylolisthesis with severe spinal canal stenosis, severe compression of the

## 2024-05-13 NOTE — DISCHARGE SUMMARY
Orthopedic Spine Discharge Summary      Patient Identification:   Tawny Rico   : 1955  MRN: [id number]   Account: [id number]      Patient's PCP: Gregg Horne MD    Admit Date: 2024     Discharge Date: 2024    Admitting Physician: Gentry Ortiz MD     Discharge Provider: David Villanueva PA-C , Dr. Gentry Ortiz    Discharge Diagnoses:  1. Lumbar spinal stenosis.  2. Low back pain, bilateral lower extremity radiculopathy, and neurogenic claudication.  3. Status post previous L4-L5 decompression and fusion by Dr. Dang in the year .  4. Obesity, body mass index of 37.59 with a weight of 219 pounds, 5 feet 4 inches in height.  5. Retrolisthesis of the L2-L3 level.  6. Acute on chronic normocytic anemia, improved    The patient was seen and examined on day of discharge and this discharge summary is in conjunction with any daily progress note from day of discharge.    Operation Performed:  1. Removal of hardware including pedicle screws, setscrew caps, bridging fermin at the L4-5 level.  2. The assessment of fusion of L4-L5 level with a bilateral finding of solid fusion through scoring and manipulation technique.  3. L5-S1 posterior lumbar interbody fusion and bilateral posterolateral fusion of the L5-S1 level.  4. Insertion of 2 interbody cages by ScaleXtreme 11 x 26 mm cage with 7 degrees of lordosis placed bilaterally at the L5-S1 level using a total of 2 cages.  5. L5 and S1 bilateral laminectomies and L5-S1 facetectomy.  6. Revision bilateral L4 hemilaminectomy with revision bilateral L4 nerve root exploration.  7. L2 and L3 bilateral laminectomies.  8. L2-L3 and L3-L4 bilateral posterolateral fusion of lumbar spine.  9. Use of allograft bone graft DBM by ScaleXtreme, a 10 mL kit of the FibreX and 2 of the 5 mL kits of the ProteiOS growth factor mixed and also used with a 10 cm kit of the BioAdaptive bridge by ScaleXtreme 10 cm in length for this L2 through S1 fusion.  10. Use of local bone cleaned of soft

## 2024-07-31 ENCOUNTER — TELEPHONE (OUTPATIENT)
Dept: WOUND CARE | Age: 69
End: 2024-07-31

## 2024-08-05 ENCOUNTER — HOSPITAL ENCOUNTER (OUTPATIENT)
Dept: WOUND CARE | Age: 69
Discharge: HOME | End: 2024-08-05
Attending: NURSE PRACTITIONER
Payer: MEDICARE

## 2024-08-05 VITALS
SYSTOLIC BLOOD PRESSURE: 148 MMHG | DIASTOLIC BLOOD PRESSURE: 67 MMHG | OXYGEN SATURATION: 96 % | TEMPERATURE: 97.8 F | RESPIRATION RATE: 18 BRPM | HEART RATE: 86 BPM

## 2024-08-05 DIAGNOSIS — S31.000A OPEN WOUND OF LUMBAR REGION: ICD-10-CM

## 2024-08-05 DIAGNOSIS — E11.65 CONTROLLED TYPE 2 DIABETES MELLITUS WITH HYPERGLYCEMIA, WITHOUT LONG-TERM CURRENT USE OF INSULIN (HCC): ICD-10-CM

## 2024-08-05 DIAGNOSIS — T81.89XA NON-HEALING SURGICAL WOUND, INITIAL ENCOUNTER: Primary | ICD-10-CM

## 2024-08-05 DIAGNOSIS — D50.0 ANEMIA DUE TO BLOOD LOSS: ICD-10-CM

## 2024-08-05 PROBLEM — R42 LIGHTHEADED: Status: RESOLVED | Noted: 2024-05-03 | Resolved: 2024-08-05

## 2024-08-05 PROCEDURE — 11042 DBRDMT SUBQ TIS 1ST 20SQCM/<: CPT | Performed by: NURSE PRACTITIONER

## 2024-08-05 PROCEDURE — 6370000000 HC RX 637 (ALT 250 FOR IP): Performed by: NURSE PRACTITIONER

## 2024-08-05 PROCEDURE — 11042 DBRDMT SUBQ TIS 1ST 20SQCM/<: CPT

## 2024-08-05 PROCEDURE — 17250 CHEM CAUT OF GRANLTJ TISSUE: CPT

## 2024-08-05 PROCEDURE — 99213 OFFICE O/P EST LOW 20 MIN: CPT

## 2024-08-05 PROCEDURE — 99204 OFFICE O/P NEW MOD 45 MIN: CPT | Performed by: NURSE PRACTITIONER

## 2024-08-05 RX ORDER — LIDOCAINE 50 MG/G
OINTMENT TOPICAL ONCE
OUTPATIENT
Start: 2024-08-05 | End: 2024-08-05

## 2024-08-05 RX ORDER — LIDOCAINE 40 MG/G
CREAM TOPICAL ONCE
Status: CANCELLED | OUTPATIENT
Start: 2024-08-05 | End: 2024-08-05

## 2024-08-05 RX ORDER — GINSENG 100 MG
CAPSULE ORAL ONCE
OUTPATIENT
Start: 2024-08-05 | End: 2024-08-05

## 2024-08-05 RX ORDER — GENTAMICIN SULFATE 1 MG/G
OINTMENT TOPICAL ONCE
Status: CANCELLED | OUTPATIENT
Start: 2024-08-05 | End: 2024-08-05

## 2024-08-05 RX ORDER — GINSENG 100 MG
CAPSULE ORAL ONCE
Status: CANCELLED | OUTPATIENT
Start: 2024-08-05 | End: 2024-08-05

## 2024-08-05 RX ORDER — LIDOCAINE HYDROCHLORIDE 40 MG/ML
SOLUTION TOPICAL ONCE
OUTPATIENT
Start: 2024-08-05 | End: 2024-08-05

## 2024-08-05 RX ORDER — TRIAMCINOLONE ACETONIDE 1 MG/G
OINTMENT TOPICAL ONCE
OUTPATIENT
Start: 2024-08-05 | End: 2024-08-05

## 2024-08-05 RX ORDER — CLOBETASOL PROPIONATE 0.5 MG/G
OINTMENT TOPICAL ONCE
Status: CANCELLED | OUTPATIENT
Start: 2024-08-05 | End: 2024-08-05

## 2024-08-05 RX ORDER — LIDOCAINE 40 MG/G
CREAM TOPICAL ONCE
OUTPATIENT
Start: 2024-08-05 | End: 2024-08-05

## 2024-08-05 RX ORDER — LIDOCAINE HYDROCHLORIDE 20 MG/ML
JELLY TOPICAL ONCE
Status: CANCELLED | OUTPATIENT
Start: 2024-08-05 | End: 2024-08-05

## 2024-08-05 RX ORDER — BETAMETHASONE DIPROPIONATE 0.5 MG/G
CREAM TOPICAL ONCE
OUTPATIENT
Start: 2024-08-05 | End: 2024-08-05

## 2024-08-05 RX ORDER — SODIUM CHLOR/HYPOCHLOROUS ACID 0.033 %
SOLUTION, IRRIGATION IRRIGATION ONCE
OUTPATIENT
Start: 2024-08-05 | End: 2024-08-05

## 2024-08-05 RX ORDER — SODIUM CHLOR/HYPOCHLOROUS ACID 0.033 %
SOLUTION, IRRIGATION IRRIGATION ONCE
Status: CANCELLED | OUTPATIENT
Start: 2024-08-05 | End: 2024-08-05

## 2024-08-05 RX ORDER — BACITRACIN ZINC AND POLYMYXIN B SULFATE 500; 1000 [USP'U]/G; [USP'U]/G
OINTMENT TOPICAL ONCE
Status: CANCELLED | OUTPATIENT
Start: 2024-08-05 | End: 2024-08-05

## 2024-08-05 RX ORDER — TRIAMCINOLONE ACETONIDE 1 MG/G
OINTMENT TOPICAL ONCE
Status: CANCELLED | OUTPATIENT
Start: 2024-08-05 | End: 2024-08-05

## 2024-08-05 RX ORDER — BETAMETHASONE DIPROPIONATE 0.5 MG/G
CREAM TOPICAL ONCE
Status: CANCELLED | OUTPATIENT
Start: 2024-08-05 | End: 2024-08-05

## 2024-08-05 RX ORDER — SULFAMETHOXAZOLE AND TRIMETHOPRIM 800; 160 MG/1; MG/1
1 TABLET ORAL 2 TIMES DAILY
Status: ON HOLD | COMMUNITY
End: 2024-08-09 | Stop reason: HOSPADM

## 2024-08-05 RX ORDER — LIDOCAINE HYDROCHLORIDE 20 MG/ML
JELLY TOPICAL ONCE
OUTPATIENT
Start: 2024-08-05 | End: 2024-08-05

## 2024-08-05 RX ORDER — LIDOCAINE HYDROCHLORIDE 40 MG/ML
SOLUTION TOPICAL ONCE
Status: CANCELLED | OUTPATIENT
Start: 2024-08-05 | End: 2024-08-05

## 2024-08-05 RX ORDER — GENTAMICIN SULFATE 1 MG/G
OINTMENT TOPICAL ONCE
OUTPATIENT
Start: 2024-08-05 | End: 2024-08-05

## 2024-08-05 RX ORDER — LIDOCAINE HYDROCHLORIDE 20 MG/ML
JELLY TOPICAL ONCE
Status: COMPLETED | OUTPATIENT
Start: 2024-08-05 | End: 2024-08-05

## 2024-08-05 RX ORDER — FERROUS SULFATE 325(65) MG
325 TABLET ORAL
COMMUNITY

## 2024-08-05 RX ORDER — BACITRACIN ZINC AND POLYMYXIN B SULFATE 500; 1000 [USP'U]/G; [USP'U]/G
OINTMENT TOPICAL ONCE
OUTPATIENT
Start: 2024-08-05 | End: 2024-08-05

## 2024-08-05 RX ORDER — CLOBETASOL PROPIONATE 0.5 MG/G
OINTMENT TOPICAL ONCE
OUTPATIENT
Start: 2024-08-05 | End: 2024-08-05

## 2024-08-05 RX ORDER — IBUPROFEN 200 MG
TABLET ORAL ONCE
Status: CANCELLED | OUTPATIENT
Start: 2024-08-05 | End: 2024-08-05

## 2024-08-05 RX ORDER — CETIRIZINE HYDROCHLORIDE 10 MG/1
10 TABLET ORAL DAILY
COMMUNITY

## 2024-08-05 RX ORDER — IBUPROFEN 200 MG
TABLET ORAL ONCE
OUTPATIENT
Start: 2024-08-05 | End: 2024-08-05

## 2024-08-05 RX ORDER — LIDOCAINE 50 MG/G
OINTMENT TOPICAL ONCE
Status: CANCELLED | OUTPATIENT
Start: 2024-08-05 | End: 2024-08-05

## 2024-08-05 RX ADMIN — SILVER NITRATE APPLICATORS 1 EACH: 25; 75 STICK TOPICAL at 15:08

## 2024-08-05 RX ADMIN — LIDOCAINE HYDROCHLORIDE: 20 JELLY TOPICAL at 14:57

## 2024-08-05 NOTE — PROGRESS NOTES
[facility]  Consult and Procedure Note      Tawny Rico  MEDICAL RECORD NUMBER:  [id number]  AGE: 69 y.o.   GENDER: female  : 1955  EPISODE DATE:  2024  Referring Provider: Gentry Ortiz MD   Reason for Referral: lumbar wound    SUBJECTIVE:     Chief Complaint   Patient presents with    Wound Check     Back          HISTORY OF PRESENT ILLNESS      Tawny Rico is a 69 y.o. female who presents today for wound/ulcer evaluation. Patient is new to the wound center. Wound duration: since (date) 24 .    Patient presents today for evaluation of nonhealing wound to back s/p  Removal of Hardware Lumbar 4-Lumbar 5, Lumbar 5 to Sacral 1 Posterior Latereal Interbody Fusion, Lumbar 2 to Lumbar 4 Laminectomy and Posterior Spinal Fusion with Local Bone Graft, Demineralized Bone Matrix and Instrumentation per Dr. Ortiz 24.  Patient was noted to have increase of operative time and duration with 1000 ml EBL.  She was discharged to [facility] for rehab post hospitalization, has since been discharged home.  Current wound care includes band-aid with help of her friend, changed twice daily.  She denies pain to wound itself, does note pain to distant periwound where she has irritation from tape. She reports moderate amount of drainage from wound.  Denies any fevers, chills.  She has history of DM, last hemoglobin A1C 7.2 on 24.  She ambulates to visit with walker today.  States that she has required blood transfusion since discharge from hospital. Continues to be anemic, last hemoglobin as reported by patient was 9.  She denies any further needs or concerns.    PAST MEDICAL HISTORY             Diagnosis Date    Arthritis     back, knees    Diabetes mellitus (HCC)     Hypertension     UTI (urinary tract infection)     recurrent       PAST SURGICAL HISTORY     Past Surgical History:   Procedure Laterality Date    BACK SURGERY      16 years ago    JOINT REPLACEMENT Right     total knee    LUMBAR 
Face sheet, referral and progress note sent to [facility]. Their office states they can see patient for wound care.   
Slough;Pale granulation tissue;Devitalized tissue 08/05/24 1449   Drainage Amount Moderate (25-50%) 08/05/24 1449   Drainage Description Serosanguinous;Yellow 08/05/24 1449   Odor None 08/05/24 1449   Kym-wound Assessment Dry/flaky;Blanchable erythema;Hyperpigmented 08/05/24 1449   Margins Unattached edges 08/05/24 1449   Wound Thickness Description not for Pressure Injury Full thickness 08/05/24 1449   Number of days: 0     Incision 05/01/24 Back Lower;Medial (Active)   Number of days: 96       Supplies Requested :        *DISPENSE AS WRITTEN*    WOUND #: 1   PRIMARY DRESSING:  Alginate rope   Cover and Secure with: Other silicone boarder foam      FREQUENCY OF DRESSING CHANGES:  Three times per week         ADDITIONAL ITEMS:  [] Gloves Small  [x] Gloves Medium [] Gloves Large [] Gloves XLarge  [] Tape 1\" [x] Tape 2\" [] Tape 3\"  [] Medipore Tape  [x] Saline  [x] Skin Prep   [] Adhesive Remover   [x] Cotton Tip Applicators   [] Other:    Patient Wound(s) Debrided: [x] Yes if yes please add date 8-5-24   [] No    Debribement Type: Mechanical     Patient currently being seen by Home Health: [] Yes   [x] No    Duration for needed supplies:  []15  [x]30  []60  []90 Days        Electronically signed by Georgina Jacome RN on 8/5/2024 at 3:28 PM     Provider Information:      PROVIDER'S NAME: Paz Collier CNP     NPI: [id number]

## 2024-08-05 NOTE — PATIENT INSTRUCTIONS
Visit Discharge/Physician Orders:  - Mechanical Debridement was completed today by using a saline and gauze.   -debridement done today   -make sure to get a good amount of protein in your diet-this helps with healing   -silver nitrate applied today, may see some grey drainage with the first dressing change   -no showering at this time  -make sure to keep blood sugars under control     [facility]: [facility], we will contact them about setting it up     Wound Location: back     Dressing orders:     1) Gather wound care supplies and arrange on clean table.     2) Wash your hands with soap and water or use alcohol based hand  for 20 seconds (sing \"Happy Birthday\" twice).    3) Cleanse wounds with normal saline or wound cleanser and gauze. Pat dry with clean gauze.    4) Back: use skin prep to the good skin around the wound, where the band aid will be. than lightly pack alginate rope (aquacel ag ribbon) into wound, make sure to leave a tail. Cover with silicone boarder foam. Change 3 times a week.     Keep all dressings clean & dry.    Follow up visit:    3 Weeks Monday August 26th at 3:00 pm     Supplies:    Duration of dressings:     We have sent your supply order to the following company:  [employer] Phone # [phone]   If you don't receive the items you were expecting or don't know what the items are that you received, call the company where the order was sent.   If you are unable to obtain wound supplies, continue to use the supplies you have available until you are able to reach us. It is most important to keep the wound covered at all times.  It is YOUR responsibility to make sure that supplies are re-ordered before you run out. Re-order telephone numbers are included in each package.     Keep next scheduled appointment. Please give 24 hour notice if unable to keep appointment. [phone]    If you experience any of the following, please call the Wound Care Service during business hours: Monday through

## 2024-08-05 NOTE — PLAN OF CARE
Problem: Wound:  Goal: Will show signs of wound healing; wound closure and no evidence of infection  Description: Will show signs of wound healing; wound closure and no evidence of infection  Outcome: Progressing   Seen today for back pain. See AVS for discharge   Care plan reviewed with patient and daughter.  Patient and daughter verbalize understanding of the plan of care and contribute to goal setting.

## 2024-08-06 ENCOUNTER — HOSPITAL ENCOUNTER (OUTPATIENT)
Age: 69
Setting detail: OBSERVATION
Discharge: HOME HEALTH | End: 2024-08-09
Attending: ORTHOPAEDIC SURGERY | Admitting: ORTHOPAEDIC SURGERY
Payer: MEDICARE

## 2024-08-06 ENCOUNTER — APPOINTMENT (OUTPATIENT)
Dept: GENERAL RADIOLOGY | Age: 69
End: 2024-08-06
Attending: ORTHOPAEDIC SURGERY
Payer: MEDICARE

## 2024-08-06 DIAGNOSIS — T81.40XD POSTOPERATIVE INFECTION, UNSPECIFIED TYPE, SUBSEQUENT ENCOUNTER: ICD-10-CM

## 2024-08-06 PROBLEM — T81.31XA POSTOPERATIVE WOUND DEHISCENCE, INITIAL ENCOUNTER: Status: ACTIVE | Noted: 2024-08-06

## 2024-08-06 LAB
ANION GAP SERPL CALC-SCNC: 13 MEQ/L (ref 8–16)
BACTERIA: ABNORMAL
BASOPHILS ABSOLUTE: 0 THOU/MM3 (ref 0–0.1)
BASOPHILS NFR BLD AUTO: 0.2 %
BILIRUB UR QL STRIP: NEGATIVE
BUN SERPL-MCNC: 39 MG/DL (ref 7–22)
CA-I BLD ISE-SCNC: 1.54 MMOL/L (ref 1.12–1.32)
CALCIUM SERPL-MCNC: 11.1 MG/DL (ref 8.5–10.5)
CASTS #/AREA URNS LPF: ABNORMAL /LPF
CASTS #/AREA URNS LPF: ABNORMAL /LPF
CHARACTER UR: ABNORMAL
CHARCOAL URNS QL MICRO: ABNORMAL
CHLORIDE SERPL-SCNC: 101 MEQ/L (ref 98–111)
CO2 SERPL-SCNC: 22 MEQ/L (ref 23–33)
COLOR UR: YELLOW
CREAT SERPL-MCNC: 1.5 MG/DL (ref 0.4–1.2)
CREAT UR-MCNC: 42.8 MG/DL
CRYSTALS URNS QL MICRO: ABNORMAL
DEPRECATED RDW RBC AUTO: 55.1 FL (ref 35–45)
EOSINOPHIL NFR BLD AUTO: 1 %
EOSINOPHILS ABSOLUTE: 0.1 THOU/MM3 (ref 0–0.4)
EPITHELIAL CELLS, UA: ABNORMAL /HPF
ERYTHROCYTE [DISTWIDTH] IN BLOOD BY AUTOMATED COUNT: 17.6 % (ref 11.5–14.5)
GFR SERPL CREATININE-BSD FRML MDRD: 37 ML/MIN/1.73M2
GLUCOSE BLD STRIP.AUTO-MCNC: 167 MG/DL (ref 70–108)
GLUCOSE SERPL-MCNC: 135 MG/DL (ref 70–108)
GLUCOSE UR QL STRIP.AUTO: NEGATIVE MG/DL
HCT VFR BLD AUTO: 28 % (ref 37–47)
HGB BLD-MCNC: 8.8 GM/DL (ref 12–16)
HGB RETIC QN AUTO: 33.4 PG (ref 28.2–35.7)
HGB UR QL STRIP.AUTO: ABNORMAL
IMM GRANULOCYTES # BLD AUTO: 0.03 THOU/MM3 (ref 0–0.07)
IMM GRANULOCYTES NFR BLD AUTO: 0.4 %
IMM RETICS NFR: 8.4 % (ref 3–15.9)
KETONES UR QL STRIP.AUTO: NEGATIVE
LEUKOCYTE ESTERASE UR QL STRIP.AUTO: ABNORMAL
LYMPHOCYTES ABSOLUTE: 2 THOU/MM3 (ref 1–4.8)
LYMPHOCYTES NFR BLD AUTO: 25.1 %
MCH RBC QN AUTO: 27.3 PG (ref 26–33)
MCHC RBC AUTO-ENTMCNC: 31.4 GM/DL (ref 32.2–35.5)
MCV RBC AUTO: 87 FL (ref 81–99)
MONOCYTES ABSOLUTE: 0.5 THOU/MM3 (ref 0.4–1.3)
MONOCYTES NFR BLD AUTO: 6.1 %
NEUTROPHILS ABSOLUTE: 5.4 THOU/MM3 (ref 1.8–7.7)
NEUTROPHILS NFR BLD AUTO: 67.2 %
NITRITE UR QL STRIP.AUTO: POSITIVE
NRBC BLD AUTO-RTO: 0 /100 WBC
OSMOLALITY UR: 322 MOSMOL/KG (ref 250–750)
PH UR STRIP.AUTO: 5.5 [PH] (ref 5–9)
PLATELET # BLD AUTO: 267 THOU/MM3 (ref 130–400)
PMV BLD AUTO: 8.7 FL (ref 9.4–12.4)
POTASSIUM SERPL-SCNC: 4.4 MEQ/L (ref 3.5–5.2)
PROT UR STRIP.AUTO-MCNC: NEGATIVE MG/DL
RBC # BLD AUTO: 3.22 MILL/MM3 (ref 4.2–5.4)
RBC #/AREA URNS HPF: ABNORMAL /HPF
RENAL EPI CELLS #/AREA URNS HPF: ABNORMAL /[HPF]
RETICS # AUTO: 66 THOU/MM3 (ref 20–115)
RETICS/RBC NFR AUTO: 2 % (ref 0.5–2)
SODIUM SERPL-SCNC: 136 MEQ/L (ref 135–145)
SODIUM UR-SCNC: 60 MEQ/L
SP GR UR REFRACT.AUTO: 1.01 (ref 1–1.03)
UROBILINOGEN UR QL STRIP.AUTO: 0.2 EU/DL (ref 0–1)
UUN 24H UR-MCNC: 380 MG/DL
WBC # BLD AUTO: 8 THOU/MM3 (ref 4.8–10.8)
WBC #/AREA URNS HPF: > 200 /HPF
YEAST LIKE FUNGI URNS QL MICRO: ABNORMAL

## 2024-08-06 PROCEDURE — 83970 ASSAY OF PARATHORMONE: CPT

## 2024-08-06 PROCEDURE — 36415 COLL VENOUS BLD VENIPUNCTURE: CPT

## 2024-08-06 PROCEDURE — 83935 ASSAY OF URINE OSMOLALITY: CPT

## 2024-08-06 PROCEDURE — 93005 ELECTROCARDIOGRAM TRACING: CPT | Performed by: PHYSICIAN ASSISTANT

## 2024-08-06 PROCEDURE — 82570 ASSAY OF URINE CREATININE: CPT

## 2024-08-06 PROCEDURE — 85025 COMPLETE CBC W/AUTO DIFF WBC: CPT

## 2024-08-06 PROCEDURE — 82728 ASSAY OF FERRITIN: CPT

## 2024-08-06 PROCEDURE — 81001 URINALYSIS AUTO W/SCOPE: CPT

## 2024-08-06 PROCEDURE — 80048 BASIC METABOLIC PNL TOTAL CA: CPT

## 2024-08-06 PROCEDURE — 84540 ASSAY OF URINE/UREA-N: CPT

## 2024-08-06 PROCEDURE — 85046 RETICYTE/HGB CONCENTRATE: CPT

## 2024-08-06 PROCEDURE — 83550 IRON BINDING TEST: CPT

## 2024-08-06 PROCEDURE — G0379 DIRECT REFER HOSPITAL OBSERV: HCPCS

## 2024-08-06 PROCEDURE — 71045 X-RAY EXAM CHEST 1 VIEW: CPT

## 2024-08-06 PROCEDURE — 87077 CULTURE AEROBIC IDENTIFY: CPT

## 2024-08-06 PROCEDURE — G0378 HOSPITAL OBSERVATION PER HR: HCPCS | Performed by: PHYSICIAN ASSISTANT

## 2024-08-06 PROCEDURE — 83540 ASSAY OF IRON: CPT

## 2024-08-06 PROCEDURE — 82948 REAGENT STRIP/BLOOD GLUCOSE: CPT

## 2024-08-06 PROCEDURE — 87086 URINE CULTURE/COLONY COUNT: CPT

## 2024-08-06 PROCEDURE — 84443 ASSAY THYROID STIM HORMONE: CPT

## 2024-08-06 PROCEDURE — G0378 HOSPITAL OBSERVATION PER HR: HCPCS

## 2024-08-06 PROCEDURE — 82330 ASSAY OF CALCIUM: CPT

## 2024-08-06 PROCEDURE — 84300 ASSAY OF URINE SODIUM: CPT

## 2024-08-06 PROCEDURE — 87186 SC STD MICRODIL/AGAR DIL: CPT

## 2024-08-06 PROCEDURE — 99223 1ST HOSP IP/OBS HIGH 75: CPT

## 2024-08-06 RX ORDER — ONDANSETRON 4 MG/1
4 TABLET, ORALLY DISINTEGRATING ORAL EVERY 8 HOURS PRN
Status: DISCONTINUED | OUTPATIENT
Start: 2024-08-06 | End: 2024-08-07 | Stop reason: SDUPTHER

## 2024-08-06 RX ORDER — ONDANSETRON 2 MG/ML
4 INJECTION INTRAMUSCULAR; INTRAVENOUS EVERY 6 HOURS PRN
Status: DISCONTINUED | OUTPATIENT
Start: 2024-08-06 | End: 2024-08-07 | Stop reason: SDUPTHER

## 2024-08-06 RX ORDER — DEXTROSE MONOHYDRATE 100 MG/ML
INJECTION, SOLUTION INTRAVENOUS CONTINUOUS PRN
Status: DISCONTINUED | OUTPATIENT
Start: 2024-08-06 | End: 2024-08-09 | Stop reason: HOSPADM

## 2024-08-06 RX ORDER — SODIUM CHLORIDE 9 MG/ML
INJECTION, SOLUTION INTRAVENOUS CONTINUOUS
Status: DISCONTINUED | OUTPATIENT
Start: 2024-08-06 | End: 2024-08-07

## 2024-08-06 RX ORDER — INSULIN LISPRO 100 [IU]/ML
0-4 INJECTION, SOLUTION INTRAVENOUS; SUBCUTANEOUS
Status: DISCONTINUED | OUTPATIENT
Start: 2024-08-07 | End: 2024-08-09 | Stop reason: HOSPADM

## 2024-08-06 RX ORDER — ATENOLOL 50 MG/1
50 TABLET ORAL DAILY
Status: DISCONTINUED | OUTPATIENT
Start: 2024-08-07 | End: 2024-08-08

## 2024-08-06 RX ORDER — IBUPROFEN 600 MG/1
1 TABLET ORAL PRN
Status: DISCONTINUED | OUTPATIENT
Start: 2024-08-06 | End: 2024-08-09 | Stop reason: HOSPADM

## 2024-08-06 RX ORDER — INSULIN LISPRO 100 [IU]/ML
0-4 INJECTION, SOLUTION INTRAVENOUS; SUBCUTANEOUS NIGHTLY
Status: DISCONTINUED | OUTPATIENT
Start: 2024-08-06 | End: 2024-08-09 | Stop reason: HOSPADM

## 2024-08-06 RX ORDER — CETIRIZINE HYDROCHLORIDE 10 MG/1
10 TABLET ORAL DAILY
Status: DISCONTINUED | OUTPATIENT
Start: 2024-08-07 | End: 2024-08-09 | Stop reason: HOSPADM

## 2024-08-06 RX ORDER — M-VIT,TX,IRON,MINS/CALC/FOLIC 27MG-0.4MG
1 TABLET ORAL DAILY
Status: DISCONTINUED | OUTPATIENT
Start: 2024-08-07 | End: 2024-08-06

## 2024-08-06 RX ORDER — POLYETHYLENE GLYCOL 3350 17 G/17G
17 POWDER, FOR SOLUTION ORAL DAILY PRN
Status: DISCONTINUED | OUTPATIENT
Start: 2024-08-06 | End: 2024-08-09 | Stop reason: HOSPADM

## 2024-08-06 RX ORDER — VITAMIN B COMPLEX
5000 TABLET ORAL DAILY
Status: DISCONTINUED | OUTPATIENT
Start: 2024-08-07 | End: 2024-08-06

## 2024-08-06 RX ORDER — SODIUM CHLORIDE 0.9 % (FLUSH) 0.9 %
5-40 SYRINGE (ML) INJECTION EVERY 12 HOURS SCHEDULED
Status: DISCONTINUED | OUTPATIENT
Start: 2024-08-06 | End: 2024-08-09 | Stop reason: HOSPADM

## 2024-08-06 RX ORDER — LANOLIN ALCOHOL/MO/W.PET/CERES
2000 CREAM (GRAM) TOPICAL DAILY
Status: DISCONTINUED | OUTPATIENT
Start: 2024-08-07 | End: 2024-08-09 | Stop reason: HOSPADM

## 2024-08-06 RX ORDER — SODIUM CHLORIDE 0.9 % (FLUSH) 0.9 %
5-40 SYRINGE (ML) INJECTION PRN
Status: DISCONTINUED | OUTPATIENT
Start: 2024-08-06 | End: 2024-08-09 | Stop reason: HOSPADM

## 2024-08-06 RX ORDER — TAMSULOSIN HYDROCHLORIDE 0.4 MG/1
0.4 CAPSULE ORAL DAILY
Status: DISCONTINUED | OUTPATIENT
Start: 2024-08-07 | End: 2024-08-09 | Stop reason: HOSPADM

## 2024-08-06 RX ORDER — SODIUM CHLORIDE 9 MG/ML
INJECTION, SOLUTION INTRAVENOUS PRN
Status: DISCONTINUED | OUTPATIENT
Start: 2024-08-06 | End: 2024-08-07 | Stop reason: SDUPTHER

## 2024-08-06 RX ORDER — BETHANECHOL CHLORIDE 25 MG/1
25 TABLET ORAL 3 TIMES DAILY
Status: DISCONTINUED | OUTPATIENT
Start: 2024-08-06 | End: 2024-08-09 | Stop reason: HOSPADM

## 2024-08-06 NOTE — PROCEDURES
PROCEDURE NOTE  Date: 8/6/2024   Name: Tawny Rico  YOB: 1955    Procedures EKG completed, given to RN

## 2024-08-06 NOTE — CONSULTS
Hospitalist Initial Consult   Internal Medicine Resident      Patient: Tawny Rico 69 y.o. female       Unit/Bed: -03/003-A   Admission date: 8/6/2024   Date of Service: 08/07/24      ASSESSMENT AND PLAN  Nonhealing wound of the lumbar spine  -status post removal of hardware L4-L5, L5-S1 posterior lateral interbody fusion, L2-L4 laminectomy and posterior spinal fusion with local bone graft, demineralized bone matrix and instrumentation per Dr. Ortiz on 05/01/2024.  - Plan for incision and drainage on 08/07/2024  - Patient denies any history of cardiovascular disease; EKG reviewed  - Patient denies any significant respiratory distress or previous reactions to anesthesia; CXR reviewed  - Recommendation for perioperative management of patient's hyperglycemia with insulin regimen  - Patient is recommended to maintain adequate hydration perioperatively, especially in the context of acute kidney injury suspected to be prerenal in etiology  - Patient is able to participate in 2-3 mets of activity without undue shortness of breath  - RCI Class II Risk, consistent with 6.0% 30 day risk of death, MI, or cardiac arrest  - Pending patient's a.m. labs as well as volume status, patient may proceed with moderate risk for lumbar incision and drainage from internal medicine perspective    Type 2 diabetes mellitus, with hyperglycemia, without the use of insulin  - 05/03/2024 HbA1c 7.2%  - Home regimen sitagliptin 100 mg, metformin 1000 mg twice daily, glipizide 10 mg twice daily  - Adjunct therapy lisinopril 5 mg  - Recommendation for perioperative use of sliding scale insulin with point-of-care glucose checks and hypoglycemia protocol    Acute kidney injury  - Baseline creatinine 0.9  -FeNa 1.5%  - Suspect prerenal etiology secondary to intravascular volume depletion and chronic use of loop diuretic with ACEi  - Gentle IV fluids ordered as trial overnight; avoid nephrotoxic agents     Acute hypercalcemia  - Unclear  leukocytes    08/06/2024 CXR showing no evidence of acute pathology    08/06/2024 EKG showing sinus rhythm with first-degree AV block with ventricular rate 75, DC interval 214, QRS 76, QTc 424    Review of systems:   Pertinent positives and negatives as noted in the HPI. Otherwise, complete ROS negative.  Past Medical, Family, Social, Surgical History: See chart    Social Determinants of Health:   Social Determinants of Health with Concerns     Alcohol Use: Not on file   Financial Resource Strain: Not on file   Physical Activity: Not on file   Stress: Not on file   Social Connections: Not on file   Intimate Partner Violence: Not on file   Depression: Not on file        OBJECTIVE  Physical Exam:  BP (!) 113/56   Pulse 70   Temp 98.1 °F (36.7 °C) (Oral)   Resp 16   Ht 1.651 m (5' 5\")   Wt 99.3 kg (219 lb)   SpO2 97%   BMI 36.44 kg/m²     General appearance: No apparent distress, appears stated age.  Eyes:  Pupils equal, round, and reactive to light. Conjunctivae/corneas clear.  HENT: Head normal in appearance. External nares normal.  Oral mucosa moist without lesions.  Hearing grossly intact.   Neck: Supple, with full range of motion. Trachea midline.  No gross JVD appreciated.  Respiratory:  Normal respiratory effort. Clear to auscultation, bilaterally without rales or wheezes or rhonchi.  Cardiovascular: Normal rate, regular rhythm with normal S1/S2 without murmurs.    No lower extremity edema.   Abdomen: Soft, non-tender, non-distended with normal bowel sounds.  Musculoskeletal: No joint swelling or tenderness. Normal tone. No abnormal movements.   Skin: Warm and dry. No rashes or lesions.  Neurologic:  No focal sensory/motor deficits in the upper and lower extremities. Cranial nerves:  grossly non-focal 2-12.     Psychiatric: Alert and oriented, normal insight and thought content.   Capillary Refill: Brisk,< 3 seconds.  Peripheral Pulses: +2 palpable, equal bilaterally.     Medications Prior to Admission:

## 2024-08-06 NOTE — FLOWSHEET NOTE
08/06/24 1733   Treatment Team Notification   Reason for Communication Evaluate  (Patient states she takes blood sugar daily and she would like to restart her home medications. she stated she takes metformin and glipizide at night, also EKG results are in)   Name of Team Member Notified Quinn Howe   Treatment Team Role Consulting Provider   Method of Communication Secure Message   Response Waiting for response   Notification Time 1600

## 2024-08-06 NOTE — PLAN OF CARE
Problem: Chronic Conditions and Co-morbidities  Goal: Patient's chronic conditions and co-morbidity symptoms are monitored and maintained or improved  8/6/2024 1743 by Letty Heck, RN  Outcome: Progressing  Flowsheets (Taken 8/6/2024 1743)  Care Plan - Patient's Chronic Conditions and Co-Morbidity Symptoms are Monitored and Maintained or Improved:   Monitor and assess patient's chronic conditions and comorbid symptoms for stability, deterioration, or improvement   Collaborate with multidisciplinary team to address chronic and comorbid conditions and prevent exacerbation or deterioration   Update acute care plan with appropriate goals if chronic or comorbid symptoms are exacerbated and prevent overall improvement and discharge     Problem: Discharge Planning  Goal: Discharge to home or other facility with appropriate resources  8/6/2024 1743 by Letty Heck, RN  Outcome: Progressing  Flowsheets (Taken 8/6/2024 1743)  Discharge to home or other facility with appropriate resources:   Identify barriers to discharge with patient and caregiver   Arrange for needed discharge resources and transportation as appropriate   Identify discharge learning needs (meds, wound care, etc)     Problem: Safety - Adult  Goal: Free from fall injury  Outcome: Progressing  Flowsheets (Taken 8/6/2024 1743)  Free From Fall Injury:   Instruct family/caregiver on patient safety   Based on caregiver fall risk screen, instruct family/caregiver to ask for assistance with transferring infant if caregiver noted to have fall risk factors

## 2024-08-07 ENCOUNTER — ANESTHESIA (OUTPATIENT)
Dept: OPERATING ROOM | Age: 69
End: 2024-08-07
Payer: MEDICARE

## 2024-08-07 ENCOUNTER — ANESTHESIA EVENT (OUTPATIENT)
Dept: OPERATING ROOM | Age: 69
End: 2024-08-07
Payer: MEDICARE

## 2024-08-07 LAB
25(OH)D3 SERPL-MCNC: 46 NG/ML (ref 30–100)
ANION GAP SERPL CALC-SCNC: 11 MEQ/L (ref 8–16)
BUN SERPL-MCNC: 37 MG/DL (ref 7–22)
CALCIUM SERPL-MCNC: 10.4 MG/DL (ref 8.5–10.5)
CHLORIDE SERPL-SCNC: 106 MEQ/L (ref 98–111)
CO2 SERPL-SCNC: 21 MEQ/L (ref 23–33)
CREAT SERPL-MCNC: 1.3 MG/DL (ref 0.4–1.2)
DEPRECATED RDW RBC AUTO: 55.6 FL (ref 35–45)
EKG ATRIAL RATE: 75 BPM
EKG P AXIS: 92 DEGREES
EKG P-R INTERVAL: 214 MS
EKG Q-T INTERVAL: 380 MS
EKG QRS DURATION: 76 MS
EKG QTC CALCULATION (BAZETT): 424 MS
EKG R AXIS: 29 DEGREES
EKG T AXIS: 83 DEGREES
EKG VENTRICULAR RATE: 75 BPM
ERYTHROCYTE [DISTWIDTH] IN BLOOD BY AUTOMATED COUNT: 17.5 % (ref 11.5–14.5)
FERRITIN SERPL IA-MCNC: 236 NG/ML (ref 10–291)
FOLATE SERPL-MCNC: > 20 NG/ML (ref 4.8–24.2)
GFR SERPL CREATININE-BSD FRML MDRD: 44 ML/MIN/1.73M2
GLUCOSE BLD STRIP.AUTO-MCNC: 135 MG/DL (ref 70–108)
GLUCOSE BLD STRIP.AUTO-MCNC: 271 MG/DL (ref 70–108)
GLUCOSE BLD STRIP.AUTO-MCNC: 319 MG/DL (ref 70–108)
GLUCOSE BLD STRIP.AUTO-MCNC: 344 MG/DL (ref 70–108)
GLUCOSE SERPL-MCNC: 122 MG/DL (ref 70–108)
HCT VFR BLD AUTO: 24.5 % (ref 37–47)
HGB BLD-MCNC: 7.7 GM/DL (ref 12–16)
IRON SATN MFR SERPL: 19 % (ref 20–50)
IRON SERPL-MCNC: 51 UG/DL (ref 50–170)
MAGNESIUM SERPL-MCNC: 1.6 MG/DL (ref 1.6–2.4)
MCH RBC QN AUTO: 27.3 PG (ref 26–33)
MCHC RBC AUTO-ENTMCNC: 31.4 GM/DL (ref 32.2–35.5)
MCV RBC AUTO: 86.9 FL (ref 81–99)
PLATELET # BLD AUTO: 228 THOU/MM3 (ref 130–400)
PMV BLD AUTO: 8.6 FL (ref 9.4–12.4)
POTASSIUM SERPL-SCNC: 4.3 MEQ/L (ref 3.5–5.2)
PTH-INTACT SERPL-MCNC: 31 PG/ML (ref 15–65)
RBC # BLD AUTO: 2.82 MILL/MM3 (ref 4.2–5.4)
SODIUM SERPL-SCNC: 138 MEQ/L (ref 135–145)
TIBC SERPL-MCNC: 271 UG/DL (ref 171–450)
TSH SERPL DL<=0.005 MIU/L-ACNC: 5.36 UIU/ML (ref 0.4–4.2)
VIT B12 SERPL-MCNC: 1389 PG/ML (ref 211–911)
WBC # BLD AUTO: 6.4 THOU/MM3 (ref 4.8–10.8)

## 2024-08-07 PROCEDURE — 87186 SC STD MICRODIL/AGAR DIL: CPT

## 2024-08-07 PROCEDURE — 3600000003 HC SURGERY LEVEL 3 BASE: Performed by: ORTHOPAEDIC SURGERY

## 2024-08-07 PROCEDURE — 93010 ELECTROCARDIOGRAM REPORT: CPT | Performed by: INTERNAL MEDICINE

## 2024-08-07 PROCEDURE — 87075 CULTR BACTERIA EXCEPT BLOOD: CPT

## 2024-08-07 PROCEDURE — 82948 REAGENT STRIP/BLOOD GLUCOSE: CPT

## 2024-08-07 PROCEDURE — 7100000000 HC PACU RECOVERY - FIRST 15 MIN: Performed by: ORTHOPAEDIC SURGERY

## 2024-08-07 PROCEDURE — 83735 ASSAY OF MAGNESIUM: CPT

## 2024-08-07 PROCEDURE — 6370000000 HC RX 637 (ALT 250 FOR IP): Performed by: PHYSICIAN ASSISTANT

## 2024-08-07 PROCEDURE — 3600000013 HC SURGERY LEVEL 3 ADDTL 15MIN: Performed by: ORTHOPAEDIC SURGERY

## 2024-08-07 PROCEDURE — 2709999900 HC NON-CHARGEABLE SUPPLY: Performed by: ORTHOPAEDIC SURGERY

## 2024-08-07 PROCEDURE — 87077 CULTURE AEROBIC IDENTIFY: CPT

## 2024-08-07 PROCEDURE — 36415 COLL VENOUS BLD VENIPUNCTURE: CPT

## 2024-08-07 PROCEDURE — 82306 VITAMIN D 25 HYDROXY: CPT

## 2024-08-07 PROCEDURE — 87205 SMEAR GRAM STAIN: CPT

## 2024-08-07 PROCEDURE — 87147 CULTURE TYPE IMMUNOLOGIC: CPT

## 2024-08-07 PROCEDURE — 2580000003 HC RX 258: Performed by: PHYSICIAN ASSISTANT

## 2024-08-07 PROCEDURE — 2580000003 HC RX 258

## 2024-08-07 PROCEDURE — 82607 VITAMIN B-12: CPT

## 2024-08-07 PROCEDURE — 6360000002 HC RX W HCPCS: Performed by: PHYSICIAN ASSISTANT

## 2024-08-07 PROCEDURE — 3700000001 HC ADD 15 MINUTES (ANESTHESIA): Performed by: ORTHOPAEDIC SURGERY

## 2024-08-07 PROCEDURE — G0378 HOSPITAL OBSERVATION PER HR: HCPCS

## 2024-08-07 PROCEDURE — 6360000002 HC RX W HCPCS: Performed by: NURSE ANESTHETIST, CERTIFIED REGISTERED

## 2024-08-07 PROCEDURE — 7100000001 HC PACU RECOVERY - ADDTL 15 MIN: Performed by: ORTHOPAEDIC SURGERY

## 2024-08-07 PROCEDURE — 80048 BASIC METABOLIC PNL TOTAL CA: CPT

## 2024-08-07 PROCEDURE — 2500000003 HC RX 250 WO HCPCS: Performed by: NURSE ANESTHETIST, CERTIFIED REGISTERED

## 2024-08-07 PROCEDURE — 85027 COMPLETE CBC AUTOMATED: CPT

## 2024-08-07 PROCEDURE — 87070 CULTURE OTHR SPECIMN AEROBIC: CPT

## 2024-08-07 PROCEDURE — 82746 ASSAY OF FOLIC ACID SERUM: CPT

## 2024-08-07 PROCEDURE — 3700000000 HC ANESTHESIA ATTENDED CARE: Performed by: ORTHOPAEDIC SURGERY

## 2024-08-07 PROCEDURE — 99232 SBSQ HOSP IP/OBS MODERATE 35: CPT | Performed by: NURSE PRACTITIONER

## 2024-08-07 RX ORDER — ACETAMINOPHEN 325 MG/1
650 TABLET ORAL EVERY 4 HOURS PRN
Status: DISCONTINUED | OUTPATIENT
Start: 2024-08-07 | End: 2024-08-09 | Stop reason: HOSPADM

## 2024-08-07 RX ORDER — BISACODYL 10 MG
10 SUPPOSITORY, RECTAL RECTAL DAILY PRN
Status: DISCONTINUED | OUTPATIENT
Start: 2024-08-07 | End: 2024-08-09 | Stop reason: HOSPADM

## 2024-08-07 RX ORDER — ENEMA 19; 7 G/133ML; G/133ML
1 ENEMA RECTAL DAILY PRN
Status: DISCONTINUED | OUTPATIENT
Start: 2024-08-07 | End: 2024-08-09 | Stop reason: HOSPADM

## 2024-08-07 RX ORDER — ROCURONIUM BROMIDE 10 MG/ML
INJECTION, SOLUTION INTRAVENOUS PRN
Status: DISCONTINUED | OUTPATIENT
Start: 2024-08-07 | End: 2024-08-07 | Stop reason: SDUPTHER

## 2024-08-07 RX ORDER — OXYCODONE HYDROCHLORIDE 5 MG/1
10 TABLET ORAL EVERY 4 HOURS PRN
Status: DISCONTINUED | OUTPATIENT
Start: 2024-08-07 | End: 2024-08-09 | Stop reason: HOSPADM

## 2024-08-07 RX ORDER — ONDANSETRON 2 MG/ML
INJECTION INTRAMUSCULAR; INTRAVENOUS PRN
Status: DISCONTINUED | OUTPATIENT
Start: 2024-08-07 | End: 2024-08-07 | Stop reason: SDUPTHER

## 2024-08-07 RX ORDER — LIDOCAINE HCL/PF 100 MG/5ML
SYRINGE (ML) INJECTION PRN
Status: DISCONTINUED | OUTPATIENT
Start: 2024-08-07 | End: 2024-08-07 | Stop reason: SDUPTHER

## 2024-08-07 RX ORDER — MORPHINE SULFATE 4 MG/ML
4 INJECTION, SOLUTION INTRAMUSCULAR; INTRAVENOUS
Status: DISCONTINUED | OUTPATIENT
Start: 2024-08-07 | End: 2024-08-09 | Stop reason: HOSPADM

## 2024-08-07 RX ORDER — ONDANSETRON 4 MG/1
4 TABLET, ORALLY DISINTEGRATING ORAL EVERY 8 HOURS PRN
Status: DISCONTINUED | OUTPATIENT
Start: 2024-08-07 | End: 2024-08-09 | Stop reason: HOSPADM

## 2024-08-07 RX ORDER — FENTANYL CITRATE 50 UG/ML
INJECTION, SOLUTION INTRAMUSCULAR; INTRAVENOUS PRN
Status: DISCONTINUED | OUTPATIENT
Start: 2024-08-07 | End: 2024-08-07 | Stop reason: SDUPTHER

## 2024-08-07 RX ORDER — MORPHINE SULFATE 2 MG/ML
2 INJECTION, SOLUTION INTRAMUSCULAR; INTRAVENOUS
Status: DISCONTINUED | OUTPATIENT
Start: 2024-08-07 | End: 2024-08-09 | Stop reason: HOSPADM

## 2024-08-07 RX ORDER — OXYCODONE HYDROCHLORIDE 5 MG/1
5 TABLET ORAL EVERY 4 HOURS PRN
Status: DISCONTINUED | OUTPATIENT
Start: 2024-08-07 | End: 2024-08-09 | Stop reason: HOSPADM

## 2024-08-07 RX ORDER — SODIUM CHLORIDE 9 MG/ML
INJECTION, SOLUTION INTRAVENOUS CONTINUOUS
Status: DISCONTINUED | OUTPATIENT
Start: 2024-08-07 | End: 2024-08-09 | Stop reason: HOSPADM

## 2024-08-07 RX ORDER — DEXAMETHASONE SODIUM PHOSPHATE 10 MG/ML
INJECTION, EMULSION INTRAMUSCULAR; INTRAVENOUS PRN
Status: DISCONTINUED | OUTPATIENT
Start: 2024-08-07 | End: 2024-08-07 | Stop reason: SDUPTHER

## 2024-08-07 RX ORDER — SODIUM CHLORIDE 0.9 % (FLUSH) 0.9 %
5-40 SYRINGE (ML) INJECTION PRN
Status: DISCONTINUED | OUTPATIENT
Start: 2024-08-07 | End: 2024-08-09 | Stop reason: HOSPADM

## 2024-08-07 RX ORDER — POLYETHYLENE GLYCOL 3350 17 G/17G
17 POWDER, FOR SOLUTION ORAL DAILY
Status: DISCONTINUED | OUTPATIENT
Start: 2024-08-07 | End: 2024-08-09 | Stop reason: HOSPADM

## 2024-08-07 RX ORDER — SODIUM CHLORIDE 0.9 % (FLUSH) 0.9 %
5-40 SYRINGE (ML) INJECTION EVERY 12 HOURS SCHEDULED
Status: DISCONTINUED | OUTPATIENT
Start: 2024-08-07 | End: 2024-08-09 | Stop reason: HOSPADM

## 2024-08-07 RX ORDER — PROPOFOL 10 MG/ML
INJECTION, EMULSION INTRAVENOUS PRN
Status: DISCONTINUED | OUTPATIENT
Start: 2024-08-07 | End: 2024-08-07 | Stop reason: SDUPTHER

## 2024-08-07 RX ORDER — SODIUM CHLORIDE 9 MG/ML
INJECTION, SOLUTION INTRAVENOUS PRN
Status: DISCONTINUED | OUTPATIENT
Start: 2024-08-07 | End: 2024-08-09 | Stop reason: HOSPADM

## 2024-08-07 RX ORDER — VANCOMYCIN HYDROCHLORIDE 1 G/20ML
INJECTION, POWDER, LYOPHILIZED, FOR SOLUTION INTRAVENOUS PRN
Status: DISCONTINUED | OUTPATIENT
Start: 2024-08-07 | End: 2024-08-07 | Stop reason: SDUPTHER

## 2024-08-07 RX ORDER — CYCLOBENZAPRINE HCL 10 MG
10 TABLET ORAL 3 TIMES DAILY PRN
Status: DISCONTINUED | OUTPATIENT
Start: 2024-08-07 | End: 2024-08-09 | Stop reason: HOSPADM

## 2024-08-07 RX ORDER — ONDANSETRON 2 MG/ML
4 INJECTION INTRAMUSCULAR; INTRAVENOUS EVERY 6 HOURS PRN
Status: DISCONTINUED | OUTPATIENT
Start: 2024-08-07 | End: 2024-08-09 | Stop reason: HOSPADM

## 2024-08-07 RX ADMIN — POLYETHYLENE GLYCOL 3350 17 G: 17 POWDER, FOR SOLUTION ORAL at 15:53

## 2024-08-07 RX ADMIN — FENTANYL CITRATE 50 MCG: 50 INJECTION, SOLUTION INTRAMUSCULAR; INTRAVENOUS at 07:21

## 2024-08-07 RX ADMIN — FENTANYL CITRATE 50 MCG: 50 INJECTION, SOLUTION INTRAMUSCULAR; INTRAVENOUS at 08:03

## 2024-08-07 RX ADMIN — SODIUM CHLORIDE: 9 INJECTION, SOLUTION INTRAVENOUS at 09:06

## 2024-08-07 RX ADMIN — SODIUM CHLORIDE, PRESERVATIVE FREE 10 ML: 5 INJECTION INTRAVENOUS at 00:20

## 2024-08-07 RX ADMIN — Medication 2000 MG: at 07:47

## 2024-08-07 RX ADMIN — ONDANSETRON 4 MG: 2 INJECTION INTRAMUSCULAR; INTRAVENOUS at 07:55

## 2024-08-07 RX ADMIN — NALOXEGOL OXALATE 12.5 MG: 12.5 TABLET, FILM COATED ORAL at 15:53

## 2024-08-07 RX ADMIN — INSULIN LISPRO 2 UNITS: 100 INJECTION, SOLUTION INTRAVENOUS; SUBCUTANEOUS at 11:51

## 2024-08-07 RX ADMIN — OXYCODONE HYDROCHLORIDE 5 MG: 5 TABLET ORAL at 15:52

## 2024-08-07 RX ADMIN — Medication 80 MG: at 07:24

## 2024-08-07 RX ADMIN — VANCOMYCIN HYDROCHLORIDE 1000 MG: 1 INJECTION, POWDER, LYOPHILIZED, FOR SOLUTION INTRAVENOUS at 07:50

## 2024-08-07 RX ADMIN — SODIUM CHLORIDE, PRESERVATIVE FREE 10 ML: 5 INJECTION INTRAVENOUS at 19:51

## 2024-08-07 RX ADMIN — SODIUM CHLORIDE: 9 INJECTION, SOLUTION INTRAVENOUS at 07:01

## 2024-08-07 RX ADMIN — INSULIN LISPRO 4 UNITS: 100 INJECTION, SOLUTION INTRAVENOUS; SUBCUTANEOUS at 20:37

## 2024-08-07 RX ADMIN — SODIUM CHLORIDE: 9 INJECTION, SOLUTION INTRAVENOUS at 00:19

## 2024-08-07 RX ADMIN — ROCURONIUM BROMIDE 50 MG: 10 INJECTION INTRAVENOUS at 07:24

## 2024-08-07 RX ADMIN — SUGAMMADEX 200 MG: 100 INJECTION, SOLUTION INTRAVENOUS at 07:55

## 2024-08-07 RX ADMIN — SODIUM CHLORIDE, PRESERVATIVE FREE 10 ML: 5 INJECTION INTRAVENOUS at 20:36

## 2024-08-07 RX ADMIN — DEXAMETHASONE SODIUM PHOSPHATE 5 MG: 10 INJECTION, EMULSION INTRAMUSCULAR; INTRAVENOUS at 07:35

## 2024-08-07 RX ADMIN — WATER 2000 MG: 1 INJECTION INTRAMUSCULAR; INTRAVENOUS; SUBCUTANEOUS at 23:47

## 2024-08-07 RX ADMIN — INSULIN LISPRO 3 UNITS: 100 INJECTION, SOLUTION INTRAVENOUS; SUBCUTANEOUS at 17:19

## 2024-08-07 RX ADMIN — PROPOFOL 150 MG: 10 INJECTION, EMULSION INTRAVENOUS at 07:24

## 2024-08-07 RX ADMIN — WATER 2000 MG: 1 INJECTION INTRAMUSCULAR; INTRAVENOUS; SUBCUTANEOUS at 15:53

## 2024-08-07 ASSESSMENT — PAIN SCALES - GENERAL
PAINLEVEL_OUTOF10: 4
PAINLEVEL_OUTOF10: 6
PAINLEVEL_OUTOF10: 3

## 2024-08-07 ASSESSMENT — PAIN SCALES - WONG BAKER: WONGBAKER_NUMERICALRESPONSE: NO HURT

## 2024-08-07 ASSESSMENT — ENCOUNTER SYMPTOMS
NAUSEA: 0
DIARRHEA: 0
VOMITING: 0
CONSTIPATION: 0
BACK PAIN: 0

## 2024-08-07 ASSESSMENT — PAIN - FUNCTIONAL ASSESSMENT: PAIN_FUNCTIONAL_ASSESSMENT: NONE - DENIES PAIN

## 2024-08-07 NOTE — BRIEF OP NOTE
Brief Postoperative Note      Patient: Tawny Rico  YOB: 1955  MRN: [id number]    Date of Procedure: 8/7/2024    Pre-Op Diagnosis Codes:     * Postoperative infection, unspecified type, subsequent encounter [T81.40XD]    Post-Op Diagnosis: Same       Procedure(s):  LUMBAR I&D    Surgeon(s):  Gentry Ortiz MD    Assistant:  First Assistant: Quinn Coyne MA  Physician Assistant: Edmundo Joya PA-C    Anesthesia: General    Estimated Blood Loss (mL): less than 50     Complications: None    Specimens:   ID Type Source Tests Collected by Time Destination   1 : lumbar wound Tissue Back CULTURE, ANAEROBIC AND AEROBIC Gentry Ortiz MD 8/7/2024 0747        Implants:  * No implants in log *      Drains: * No LDAs found *    Findings:  Infection Present At Time Of Surgery (PATOS) (choose all levels that have infection present):  - Superficial Infection (skin/subcutaneous) present as evidenced by phlegmon  Other Findings: same    Electronically signed by Gentry Ortiz MD on 8/7/2024 at 8:05 AM

## 2024-08-07 NOTE — PLAN OF CARE
Problem: Chronic Conditions and Co-morbidities  Goal: Patient's chronic conditions and co-morbidity symptoms are monitored and maintained or improved  8/7/2024 1029 by Rocael Saunders, RN  Outcome: Progressing  Flowsheets (Taken 8/7/2024 1029)  Care Plan - Patient's Chronic Conditions and Co-Morbidity Symptoms are Monitored and Maintained or Improved:   Monitor and assess patient's chronic conditions and comorbid symptoms for stability, deterioration, or improvement   Collaborate with multidisciplinary team to address chronic and comorbid conditions and prevent exacerbation or deterioration   Update acute care plan with appropriate goals if chronic or comorbid symptoms are exacerbated and prevent overall improvement and discharge     Problem: Discharge Planning  Goal: Discharge to home or other facility with appropriate resources  8/7/2024 1029 by Rocael Saunders, RN  Outcome: Progressing  Flowsheets (Taken 8/7/2024 1029)  Discharge to home or other facility with appropriate resources:   Identify barriers to discharge with patient and caregiver   Arrange for needed discharge resources and transportation as appropriate   Identify discharge learning needs (meds, wound care, etc)     Problem: Safety - Adult  Goal: Free from fall injury  8/7/2024 1029 by Rocael Saunders, RN  Outcome: Progressing  Flowsheets (Taken 8/7/2024 1029)  Free From Fall Injury: Instruct family/caregiver on patient safety     Problem: Pain  Goal: Verbalizes/displays adequate comfort level or baseline comfort level  Outcome: Progressing  Flowsheets (Taken 8/7/2024 1029)  Verbalizes/displays adequate comfort level or baseline comfort level:   Encourage patient to monitor pain and request assistance   Assess pain using appropriate pain scale   Implement non-pharmacological measures as appropriate and evaluate response   Administer analgesics based on type and severity of pain and evaluate response     Problem: Chronic Conditions and

## 2024-08-07 NOTE — H&P
Orthopedic Spine H&P  Department of Orthopedic Surgery  Gisel Horvath, KT  Attending: Dr. Gentry Ortiz    Chief Complaint: Lumbar wound dehiscence     HPI: Tawny is a 69-year-old female who presents to the office with complaints of a lumbar open incision.  She is a patient who had previously underwent lumbar spine surgery and instrumentation with Dr. Ortiz and on 5/1/2020 for for lower extremity radicular symptoms and did continue to struggle with lumbar wound healing and was previously following with [facility] where care clinic with the use of oral antibiotics and wound care.  She was most recently seen by them on the date of 5/01/2024 for for continued nonhealing wound care in which it was decided further evaluation with our office for return to the OR for an I&D and wound closure due to a continued nonhealing wound postsurgery back in May 2024.  She is a patient who deals with diabetes with her most recent A1c 7.1.  She reports continued increasing her protein as she was previously dealing with nutritional support postsurgery.  Due to ongoing difficulty with lumbar wound healing in which she has maximized outpatient wound clinic care it was decided she be admitted to [facility] for further evaluation and plan to return to the OR for an I&D and wound care with a consultation with infectious disease for antibiotic treatments and supportive care postsurgery.  She denies any significant fever or chills.  She denies any lower extremity radicular symptoms.  She denies any significant back pain.  She denies any changes in her bowel or bladder continence.    Assessment:   Nonhealing surgical wound  History diabetes most recent A1c 7.1  Anemia  Status post L2-S1 PSF on 5/1/24 by Dr. Ortiz     Plan:   N.p.o. for plan to present to surgery for an I&D and wound VAC placement   Consent for I&D and wound closure with possible wound VAC placement  Consultant I&D for interoperative

## 2024-08-07 NOTE — ANESTHESIA PRE PROCEDURE
Department of Anesthesiology  Preprocedure Note       Name:  Tawny Rico   Age:  69 y.o.  :  1955                                          MRN:  [id number]         Date:  2024      Surgeon: Surgeon(s):  Gentry Ortiz MD    Procedure: Procedure(s):  LUMBAR I&D    Medications prior to admission:   Prior to Admission medications    Medication Sig Start Date End Date Taking? Authorizing Provider   ferrous sulfate (IRON 325) 325 (65 Fe) MG tablet Take 1 tablet by mouth daily (with breakfast)    Socorro Ashton MD   cetirizine (ZYRTEC) 10 MG tablet Take 1 tablet by mouth daily    Socorro Ashton MD   sulfamethoxazole-trimethoprim (BACTRIM DS;SEPTRA DS) 800-160 MG per tablet Take 1 tablet by mouth 2 times daily    Socorro Ashton MD   docusate sodium (COLACE, DULCOLAX) 100 MG CAPS Take 100 mg by mouth 2 times daily as needed for Constipation  Patient not taking: Reported on 2024   David Villanueva PA-C   methenamine (HIPREX) 1 g tablet Take 1 tablet by mouth 2 times daily (with meals)    Socorro Ashton MD   vitamin B-12 (CYANOCOBALAMIN) 1000 MCG tablet Take 2 tablets by mouth daily    Socorro Ashton MD   SITagliptin (JANUVIA) 100 MG tablet Take 1 tablet by mouth daily    Socorro Ashton MD   metFORMIN (GLUCOPHAGE) 500 MG tablet Take 2 tablets by mouth 2 times daily (with meals)    Socorro Ashton MD   lisinopril (PRINIVIL;ZESTRIL) 5 MG tablet Take 1 tablet by mouth daily    Socorro Ashton MD   atenolol (TENORMIN) 50 MG tablet Take 1 tablet by mouth daily    Socorro Ashton MD   Vitamin D3 125 MCG (5000 UT) TABS tablet Take 1 tablet by mouth daily    Socorro Ashton MD   aspirin 81 MG EC tablet Take 1 tablet by mouth daily    Socorro Ashton MD   tamsulosin (FLOMAX) 0.4 MG capsule Take 1 capsule by mouth daily    Socorro Ashton MD   ibuprofen (ADVIL;MOTRIN) 200 MG tablet Take 1 tablet by mouth every 6 hours as needed for

## 2024-08-07 NOTE — ANESTHESIA POSTPROCEDURE EVALUATION
Department of Anesthesiology  Postprocedure Note    Patient: Tawny Rico  MRN: [id number]  YOB: 1955  Date of evaluation: 8/7/2024    Procedure Summary       Date: 08/07/24 Room / Location: [facility], [place] OR [address] [place] [address] OR    Anesthesia Start: 0721 Anesthesia Stop: 0810    Procedure: LUMBAR I&D (Back) Diagnosis:       Postoperative infection, unspecified type, subsequent encounter      (Postoperative infection, unspecified type, subsequent encounter [T81.40XD])    Surgeons: Gentry Ortiz MD Responsible Provider: Nathan Gandhi MD    Anesthesia Type: general ASA Status: 2            Anesthesia Type: No value filed.    Matthieu Phase I: Matthieu Score: 9    Matthieu Phase II:      Anesthesia Post Evaluation    Patient location during evaluation: PACU  Patient participation: complete - patient participated  Level of consciousness: sleepy but conscious and awake  Pain score: 0  Airway patency: patent  Nausea & Vomiting: no nausea and no vomiting  Cardiovascular status: blood pressure returned to baseline  Respiratory status: acceptable, spontaneous ventilation and nonlabored ventilation  Hydration status: stable  Multimodal analgesia pain management approach  Pain management: adequate        No notable events documented.

## 2024-08-07 NOTE — PROGRESS NOTES
0806 Patient arrived to PACU. Patient arousable to voice. Dressing CDI. Ice pack applied. Patient denies pain at this time. VSS.    0815 Patient resting in bed with eyes closed, but arousable to calling. Patient denies pain at this time. Respirations easy and unlabored. VSS.    0825  Patient resting in bed with eyes closed, but arousable to calling. Patient denies pain at this time. Respirations easy and unlabored. VSS.    0830 Report given to  RN.    0836 Patient meets criteria to discharge from PACU.    0847 Patient transported to [facility] in stable condition.

## 2024-08-07 NOTE — PLAN OF CARE
Problem: Chronic Conditions and Co-morbidities  Goal: Patient's chronic conditions and co-morbidity symptoms are monitored and maintained or improved  8/7/2024 0045 by Gia Granado RN  Outcome: Not Progressing  Flowsheets (Taken 8/7/2024 0045)  Care Plan - Patient's Chronic Conditions and Co-Morbidity Symptoms are Monitored and Maintained or Improved: Monitor and assess patient's chronic conditions and comorbid symptoms for stability, deterioration, or improvement     Problem: Discharge Planning  Goal: Discharge to home or other facility with appropriate resources  8/7/2024 0045 by Gia Granado RN  Outcome: Not Progressing  Flowsheets (Taken 8/7/2024 0045)  Discharge to home or other facility with appropriate resources: Identify barriers to discharge with patient and caregiver     Problem: Safety - Adult  Goal: Free from fall injury  8/7/2024 0045 by Gia Granado RN  Outcome: Not Progressing  Flowsheets (Taken 8/7/2024 0045)  Free From Fall Injury: Instruct family/caregiver on patient safety  Note: Patient has remained free of physical injury during this shift. Safe environment provided, call light within reach, and hourly rounding completed.   Problem: Chronic Conditions and Co-morbidities  Goal: Patient's chronic conditions and co-morbidity symptoms are monitored and maintained or improved  8/7/2024 0045 by Gia Granado RN  Outcome: Not Progressing  Flowsheets (Taken 8/7/2024 0045)  Care Plan - Patient's Chronic Conditions and Co-Morbidity Symptoms are Monitored and Maintained or Improved: Monitor and assess patient's chronic conditions and comorbid symptoms for stability, deterioration, or improvement  8/6/2024 1743 by Letty Heck, RN  Outcome: Progressing  Flowsheets (Taken 8/6/2024 1743)  Care Plan - Patient's Chronic Conditions and Co-Morbidity Symptoms are Monitored and Maintained or Improved:   Monitor and assess patient's chronic conditions and comorbid symptoms for

## 2024-08-07 NOTE — OP NOTE
[place] [id number]                            OPERATIVE REPORT      PATIENT NAME: LINDSAY PORRAS                : 1955  MED REC NO: [id number]                       ROOM: [facility]                                               (General) POOL                                               RM    ACCOUNT NO: [id number]                       ADMIT DATE: 2024  PROVIDER: Gentry Ortiz MD      DATE OF PROCEDURE:  2024    SURGEON:  Gentry Ortiz MD    PREOPERATIVE DIAGNOSES:    1. Status post L2-L4 laminectomy and fusion and L5-S1 posterior lumbar interbody fusion from 2024.  2. Superficial and suprafascial wound dehiscence at upper portion of lumbar wound.    POSTOPERATIVE DIAGNOSES:    1. Status post L2-L4 laminectomy and fusion and L5-S1 posterior lumbar interbody fusion from 2024.  2. Superficial and suprafascial wound dehiscence at upper portion of lumbar wound.    PROCEDURES:  Sharp incisional debridement and excision of wound edge of lumbar spine wound with closure of wound following debridement, all suprafascial.    ASSISTANT:  Edmundo Joya PA-C, who assisted the patient positioning, prepping, draping, surgical retraction, wound excision and debridement sharp excisional, and wound closure.    ANESTHESIA:  General.    BLOOD LOSS:  Less than 50 mL.    DRAINS:  Zero.    COMPLICATIONS:  Zero.    SPECIMEN:  Suprafascial skin and soft tissue for microbiologic specimen for aerobic and anaerobic culturing.    INDICATIONS:  The patient is 69 years of age.  She underwent surgery on May 1, 2024, a revision surgery to include the removal of hardware of L4-L5 with an L5-S1 PLIF and L2 through L4 laminectomy and fusion with instrumentation and bone grafting on 2024 performed here at [facility].  In the following weeks and months after surgery, there has been a scab over her upper portion of her lumbar

## 2024-08-07 NOTE — CARE COORDINATION
Case Management Assessment Initial Evaluation    Date/Time of Evaluation: 8/7/2024 7:52 AM  Assessment Completed by: Cami Riggins RN    If patient is discharged prior to next notation, then this note serves as note for discharge by case management.    Patient Name: Tawny Rico                   YOB: 1955  Diagnosis: Open wound of lumbar region [S31.000A]  Postoperative wound dehiscence, initial encounter [T81.31XA]                   Date / Time: 8/6/2024  1:21 PM  Location: [facility] OR (General) POOL R*     Patient Admission Status: Observation   Readmission Risk Low 0-14, Mod 15-19), High > 20: Readmission Risk Score: 7    Current PCP: Gregg Horne MD  Health Care Decision Makers: child Rose    Additional Case Management Notes: direct admit from Dr Wahl office    Procedures:   8/7 planning I & D lumbar wound by Dr Ortiz    Imaging: na      Patient Goals/Plan/Treatment Preferences: Tawny was going to wound clinic pta with Paz Collier CNP for Non-Healing Surgical lumbar wound. Planning to follow up with [facility] at time of discharge.  10:27 AM  OR report and H & P faxed to [facility].    Met with Tawny; she plans home at discharge and follow with new [facility] as pta. Insurance verified, has transportation, has DME. Declined SNF.            08/07/24 1421   Service Assessment   Patient Orientation Alert and Oriented   Cognition Alert   History Provided By Patient   Primary Caregiver Self   Accompanied By/Relationship unaccomp.   Support Systems Children;Family Members   Patient's Healthcare Decision Maker is: Named in Scanned ACP Document   PCP Verified by CM Yes   Last Visit to PCP Within last 3 months   Prior Functional Level Assistance with the following:;Housework;Shopping;Mobility;Bathing   Current Functional Level Assistance with the following:;Mobility;Shopping;Housework;Bathing   Can patient return to prior living arrangement Yes   Ability to make needs known: Good   Family  able to assist with home care needs: Yes   Would you like for me to discuss the discharge plan with any other family members/significant others, and if so, who? No   Financial Resources Medicare   Community Resources ECF/Home Care   CM/SW Referral DME   Social/Functional History   Lives With Alone   Type of Home Apartment   Home Layout One level   Active  No   Patient's  Info no on restrictions   Discharge Planning   Type of Residence Apartment   Living Arrangements Alone   Current Services Prior To Admission Durable Medical Equipment   Current DME Prior to Arrival Walker;Shower Chair;Cane  (grab bars, rollator, RW, sock aid, grabbers)   Potential Assistance Needed Home Care   DME Ordered? No   Potential Assistance Purchasing Medications No   Type of Home Care Services Nursing Services   Patient expects to be discharged to: Apartment   Follow Up Appointment: Best Day/Time  Monday PM   One/Two Story Residence One story   History of falls? 0   Services At/After Discharge   Transition of Care Consult (CM Consult) Discharge Planning;Home Health   Internal Home Health No   Reason Outside Agency Chosen Patient already serviced by other home care/hospice agency   Services At/After Discharge Nursing services;Home Health   Confirm Follow Up Transport Family   Condition of Participation: Discharge Planning   The Plan for Transition of Care is related to the following treatment goals: feel better after surgery

## 2024-08-08 PROBLEM — T81.40XA POSTOPERATIVE INFECTION: Status: ACTIVE | Noted: 2024-08-08

## 2024-08-08 PROBLEM — E11.65 TYPE 2 DIABETES MELLITUS WITH HYPERGLYCEMIA, WITHOUT LONG-TERM CURRENT USE OF INSULIN (HCC): Status: ACTIVE | Noted: 2024-08-08

## 2024-08-08 PROBLEM — N17.9 ACUTE KIDNEY INJURY SUPERIMPOSED ON CHRONIC KIDNEY DISEASE (HCC): Status: ACTIVE | Noted: 2024-08-08

## 2024-08-08 PROBLEM — E83.52 HYPERCALCEMIA: Status: ACTIVE | Noted: 2024-08-08

## 2024-08-08 PROBLEM — N18.9 ACUTE KIDNEY INJURY SUPERIMPOSED ON CHRONIC KIDNEY DISEASE (HCC): Status: ACTIVE | Noted: 2024-08-08

## 2024-08-08 LAB
BACTERIA URNS QL MICRO: ABNORMAL /HPF
BASOPHILS ABSOLUTE: 0 THOU/MM3 (ref 0–0.1)
BASOPHILS NFR BLD AUTO: 0.3 %
BILIRUB UR QL STRIP.AUTO: NEGATIVE
CASTS #/AREA URNS LPF: ABNORMAL /LPF
CASTS 2: ABNORMAL /LPF
CHARACTER UR: ABNORMAL
COLOR, UA: YELLOW
CRYSTALS URNS MICRO: ABNORMAL
DEPRECATED RDW RBC AUTO: 57.6 FL (ref 35–45)
EOSINOPHIL NFR BLD AUTO: 0.8 %
EOSINOPHILS ABSOLUTE: 0.1 THOU/MM3 (ref 0–0.4)
EPITHELIAL CELLS, UA: ABNORMAL /HPF
ERYTHROCYTE [DISTWIDTH] IN BLOOD BY AUTOMATED COUNT: 17.8 % (ref 11.5–14.5)
GLUCOSE BLD STRIP.AUTO-MCNC: 215 MG/DL (ref 70–108)
GLUCOSE BLD STRIP.AUTO-MCNC: 233 MG/DL (ref 70–108)
GLUCOSE BLD STRIP.AUTO-MCNC: 258 MG/DL (ref 70–108)
GLUCOSE BLD STRIP.AUTO-MCNC: 290 MG/DL (ref 70–108)
GLUCOSE UR QL STRIP.AUTO: NEGATIVE MG/DL
HCT VFR BLD AUTO: 26.8 % (ref 37–47)
HGB BLD-MCNC: 8.3 GM/DL (ref 12–16)
HGB UR QL STRIP.AUTO: ABNORMAL
IMM GRANULOCYTES # BLD AUTO: 0.04 THOU/MM3 (ref 0–0.07)
IMM GRANULOCYTES NFR BLD AUTO: 0.5 %
KETONES UR QL STRIP.AUTO: ABNORMAL
LYMPHOCYTES ABSOLUTE: 2.1 THOU/MM3 (ref 1–4.8)
LYMPHOCYTES NFR BLD AUTO: 25.9 %
MCH RBC QN AUTO: 27.2 PG (ref 26–33)
MCHC RBC AUTO-ENTMCNC: 31 GM/DL (ref 32.2–35.5)
MCV RBC AUTO: 87.9 FL (ref 81–99)
MISCELLANEOUS 2: ABNORMAL
MONOCYTES ABSOLUTE: 0.7 THOU/MM3 (ref 0.4–1.3)
MONOCYTES NFR BLD AUTO: 8.4 %
NEUTROPHILS ABSOLUTE: 5.1 THOU/MM3 (ref 1.8–7.7)
NEUTROPHILS NFR BLD AUTO: 64.1 %
NITRITE UR QL STRIP: POSITIVE
NRBC BLD AUTO-RTO: 0 /100 WBC
PH UR STRIP.AUTO: 6 [PH] (ref 5–9)
PLATELET # BLD AUTO: 245 THOU/MM3 (ref 130–400)
PMV BLD AUTO: 8.9 FL (ref 9.4–12.4)
PROT UR STRIP.AUTO-MCNC: ABNORMAL MG/DL
RBC # BLD AUTO: 3.05 MILL/MM3 (ref 4.2–5.4)
RBC URINE: ABNORMAL /HPF
RENAL EPI CELLS #/AREA URNS HPF: ABNORMAL /[HPF]
SP GR UR REFRACT.AUTO: 1.01 (ref 1–1.03)
UROBILINOGEN, URINE: 0.2 EU/DL (ref 0–1)
WBC # BLD AUTO: 8 THOU/MM3 (ref 4.8–10.8)
WBC #/AREA URNS HPF: > 200 /HPF
WBC #/AREA URNS HPF: ABNORMAL /[HPF]
YEAST LIKE FUNGI URNS QL MICRO: ABNORMAL

## 2024-08-08 PROCEDURE — 97162 PT EVAL MOD COMPLEX 30 MIN: CPT

## 2024-08-08 PROCEDURE — 87077 CULTURE AEROBIC IDENTIFY: CPT

## 2024-08-08 PROCEDURE — G0378 HOSPITAL OBSERVATION PER HR: HCPCS

## 2024-08-08 PROCEDURE — 87186 SC STD MICRODIL/AGAR DIL: CPT

## 2024-08-08 PROCEDURE — 6370000000 HC RX 637 (ALT 250 FOR IP): Performed by: PHYSICIAN ASSISTANT

## 2024-08-08 PROCEDURE — 87086 URINE CULTURE/COLONY COUNT: CPT

## 2024-08-08 PROCEDURE — 97535 SELF CARE MNGMENT TRAINING: CPT

## 2024-08-08 PROCEDURE — 85025 COMPLETE CBC W/AUTO DIFF WBC: CPT

## 2024-08-08 PROCEDURE — 81001 URINALYSIS AUTO W/SCOPE: CPT

## 2024-08-08 PROCEDURE — 6370000000 HC RX 637 (ALT 250 FOR IP): Performed by: NURSE PRACTITIONER

## 2024-08-08 PROCEDURE — 82948 REAGENT STRIP/BLOOD GLUCOSE: CPT

## 2024-08-08 PROCEDURE — 36415 COLL VENOUS BLD VENIPUNCTURE: CPT

## 2024-08-08 PROCEDURE — 97530 THERAPEUTIC ACTIVITIES: CPT

## 2024-08-08 PROCEDURE — 97116 GAIT TRAINING THERAPY: CPT

## 2024-08-08 PROCEDURE — 97166 OT EVAL MOD COMPLEX 45 MIN: CPT

## 2024-08-08 PROCEDURE — 99232 SBSQ HOSP IP/OBS MODERATE 35: CPT | Performed by: NURSE PRACTITIONER

## 2024-08-08 PROCEDURE — 6370000000 HC RX 637 (ALT 250 FOR IP): Performed by: INTERNAL MEDICINE

## 2024-08-08 PROCEDURE — 2580000003 HC RX 258: Performed by: PHYSICIAN ASSISTANT

## 2024-08-08 RX ORDER — DOXYCYCLINE HYCLATE 100 MG
100 TABLET ORAL EVERY 12 HOURS SCHEDULED
Status: DISCONTINUED | OUTPATIENT
Start: 2024-08-08 | End: 2024-08-09 | Stop reason: HOSPADM

## 2024-08-08 RX ORDER — GRANULES FOR ORAL 3 G/1
3 POWDER ORAL ONCE
Status: COMPLETED | OUTPATIENT
Start: 2024-08-08 | End: 2024-08-08

## 2024-08-08 RX ORDER — ATENOLOL 50 MG/1
50 TABLET ORAL DAILY
Status: DISCONTINUED | OUTPATIENT
Start: 2024-08-08 | End: 2024-08-09 | Stop reason: HOSPADM

## 2024-08-08 RX ADMIN — INSULIN LISPRO 1 UNITS: 100 INJECTION, SOLUTION INTRAVENOUS; SUBCUTANEOUS at 07:57

## 2024-08-08 RX ADMIN — ATENOLOL 50 MG: 50 TABLET ORAL at 11:41

## 2024-08-08 RX ADMIN — GRANULES FOR ORAL SOLUTION 1 PACKET: 3 POWDER ORAL at 16:34

## 2024-08-08 RX ADMIN — NALOXEGOL OXALATE 12.5 MG: 12.5 TABLET, FILM COATED ORAL at 07:57

## 2024-08-08 RX ADMIN — DOXYCYCLINE HYCLATE 100 MG: 100 TABLET, COATED ORAL at 11:41

## 2024-08-08 RX ADMIN — INSULIN LISPRO 2 UNITS: 100 INJECTION, SOLUTION INTRAVENOUS; SUBCUTANEOUS at 16:34

## 2024-08-08 RX ADMIN — POLYETHYLENE GLYCOL 3350 17 G: 17 POWDER, FOR SOLUTION ORAL at 07:57

## 2024-08-08 RX ADMIN — DOXYCYCLINE HYCLATE 100 MG: 100 TABLET, COATED ORAL at 20:09

## 2024-08-08 RX ADMIN — INSULIN LISPRO 1 UNITS: 100 INJECTION, SOLUTION INTRAVENOUS; SUBCUTANEOUS at 11:41

## 2024-08-08 RX ADMIN — SODIUM CHLORIDE, PRESERVATIVE FREE 10 ML: 5 INJECTION INTRAVENOUS at 07:57

## 2024-08-08 RX ADMIN — SODIUM CHLORIDE, PRESERVATIVE FREE 10 ML: 5 INJECTION INTRAVENOUS at 20:08

## 2024-08-08 RX ADMIN — SODIUM CHLORIDE, PRESERVATIVE FREE 10 ML: 5 INJECTION INTRAVENOUS at 20:10

## 2024-08-08 ASSESSMENT — PAIN SCALES - WONG BAKER
WONGBAKER_NUMERICALRESPONSE: NO HURT

## 2024-08-08 ASSESSMENT — PAIN SCALES - GENERAL
PAINLEVEL_OUTOF10: 0
PAINLEVEL_OUTOF10: 0

## 2024-08-08 NOTE — PROGRESS NOTES
43.63  Mobility Inpatient CMS 0-100% Score: 46.58          Modified Cameron:  Premorbid Functional Status: Not Applicable  Current Functional Status:  Not Applicable    ASSESSMENT:  Activity Tolerance:  Patient tolerance of treatment:Good.    Treatment Initiated: Treatment and education initiated within context of evaluation.  Evaluation time included review of current medical information, gathering information related to past medical, social and functional history, completion of standardized testing, formal and informal observation of tasks, assessment of data and development of plan of care and goals.  Treatment time included skilled education and facilitation of tasks to increase safety and independence with functional mobility for improved independence and quality of life.    Assessment:  Body Structures, Functions, Activity Limitations Requiring Skilled Therapeutic Intervention: Decreased functional mobility , Decreased balance, Decreased strength, Decreased posture, Decreased endurance  Assessment: Tawny Rico is a 69 y.o. female that presents with Open wound of lumbar region. Pt demonstrates a decrease in baseline by way of bed mobility, transfers and ambulation secondary to decreased activity tolerance, strength, fatigue, and balance deficits. Pt will benefit from skilled PT services throughout admission and beyond hospital discharge for improvements in functional mobility and in order to decrease fall risk and return pt to PLOF.     Therapy Prognosis: Good    Requires PT Follow-Up: Yes    Patient Education:      .    Patient Education  Education Given To: Patient  Education Provided: Role of Therapy, Plan of Care, Precautions  Education Method: Verbal  Barriers to Learning: None  Education Outcome: Verbalized understanding       Plan:  Current Treatment Recommendations: Strengthening, Balance training, Functional mobility training, Gait training, Stair training, Safety education & training,  Patient/Caregiver education & training, Therapeutic activities  General Plan:  (5xO)    Goals:  Patient Goals : Pt goal to return home  Short Term Goals  Time Frame for Short Term Goals: By discharge  Short Term Goal 1: Supine to/from sit at Mod I in order to get in/out of bed.  Short Term Goal 2: Sit to/from stand at Mod I in order to get up to walk.  Short Term Goal 3: Ambulate 150 feet with rollator at Mod I in order to walk in home safely.  Short Term Goal 4: Ascend/Descend 1 platform step with rollator at Mod I to enter/exit home.  Long Term Goals  Time Frame for Long Term Goals : NA due to short ELOS    Following session, patient left in safe position with all fall risk precautions in place.

## 2024-08-08 NOTE — PROGRESS NOTES
(still needed?): []Yes / [x]No    Level of care: []Step Down / [x]Med-Surg  Bed Status: []Inpatient / [x]Observation  PT/OT: []Yes / [x]No    DVT Prophylaxis: [] Lovenox / [] Heparin / [x] SCDs / [] Already on Systemic Anticoagulation / [] None       Active Hospital Problems    Diagnosis Date Noted    Postoperative wound dehiscence, initial encounter [T81.31XA] 08/06/2024    Open wound of lumbar region [S31.000A] 08/05/2024       Electronically signed by WILLIS Mares CNP on 8/8/2024 at 6:51 AM

## 2024-08-08 NOTE — PROGRESS NOTES
Comprehensive Nutrition Assessment    Type and Reason for Visit:  Initial, Wound (RD screening - No positive/Consult)    Nutrition Recommendations/Plan:   Continue diet per provider and encourage adequate PO intake at best efforts.   Continue ONS: Jamie (BID) - to assist with wound healing efforts  Continue ileus prevention protocol (TID) and recommend to continue bowel regimen as needed  Recommend MVI to assist with healing efforts     Malnutrition Assessment:  Malnutrition Status:  At risk for malnutrition (Comment) (08/08/24 1117)    Context:  Acute Illness     Findings of the 6 clinical characteristics of malnutrition:  Energy Intake:  Mild decrease in energy intake (Comment) (PO intakes variable this admission)  Weight Loss:  No significant weight loss (per EMR and pt, however; difficult to assess with edema)     Body Fat Loss:  No significant body fat loss     Muscle Mass Loss:  No significant muscle mass loss    Fluid Accumulation:  Mild Extremities   Strength:  Not Performed    Nutrition Assessment:     Pt. nutritionally compromised AEB wounds.  At risk for further nutrition compromise r/t increased nutrient needs for wound healing - s/p I&D 8/7, underlying medical condition (PMHx: uncontrolled DM, HTN, s/p lumbar fusion and hardware removal 5/1/24).       Nutrition Related Findings:    Pt. Report/Treatments/Miscellaneous: Pt seen, states that her appetite has been pretty good lately and has had much improvement since lumbar surgery in May. Pt states she monitors her BS at home daily and takes her medications. She does not CHO count but does keep a running tally of the CHO she eats and prioritizes which CHO she would rather have (ex: instead of having bread/bun, she would rather eat one cookie). Reinforced importance of BS control and wound healing. All questions regarding DM diet answered at this time - pt familiar with diet. Encouraged good sources of protein to assist with wound healing efforts.   GI

## 2024-08-08 NOTE — CONSULTS
CONSULTATION NOTE :ID       Patient - Tawny Rico,  Age - 69 y.o.    - 1955      Room Number - 7K-03/003-A   N -  [id number]   [facility] # - [id number]  Date of Admission -  2024  1:21 PM  Patient's PCP: Gregg Horne MD     Requesting Physician: Gentry Ortiz MD    REASON FOR CONSULTATION   Lumbar wound dehiscence  CHIEF COMPLAINT   Open wound    HISTORY OF PRESENT ILLNESS       This is a very pleasant 69 y.o. female who was admitted to the hospital with a chief complaints of open lumbar wound. She had back surgery on May, she was doing well, the lower edge of the wound became necrotic and spit open. There was no fever or chills. She was taken to OR where she had debridement and closure of the wound. She denies any fever, no head ache she received perioperative antibiotic. The wound cx so far is negative. She wants to go home.no cough or chest pain, no urinary complaints    PAST MEDICAL  HISTORY       Past Medical History:   Diagnosis Date    Arthritis     back, knees    Diabetes mellitus (HCC)     Hypertension     UTI (urinary tract infection)     recurrent       PAST SURGICAL HISTORY     Past Surgical History:   Procedure Laterality Date    BACK SURGERY      16 years ago    JOINT REPLACEMENT Right     total knee    LUMBAR FUSION N/A 2024    Removal of Hardware Lumbar 4-Lumbar 5, Lumbar 5 to Sacral 1 Posterior Latereal Interbody Fusion, Lumbar 2 to Lumbar 4 Laminectomy and Posterior Spinal Fusion with Local Bone Graft, Demineralized Bone Matrix and Instrumentation performed by Gentry Ortiz MD at [facility] OR    NECK SURGERY      acdf 5-6         MEDICATIONS:       Scheduled Meds:   atenolol  50 mg Oral Daily    sodium chloride flush  5-40 mL IntraVENous 2 times per day    polyethylene glycol  17 g Oral Daily    naloxegol  12.5 mg Oral QAM    sodium chloride flush  5-40 mL IntraVENous 2 times per day    [Held by provider] bethanechol  25 mg Oral TID    [Held by  intolerance  Hematology: no anemia, no easy brusing or bleeding, no hx of clotting disorder  Dermatology: no skin rash, no skin lesions, no pruritis,  Neurological:no headaches,no dizziness, no seizure, no numbness.  Psychiatry: no depression, no anxiety,no panic attacks, no suicide ideation    PHYSICAL EXAM:     BP (!) 136/58   Pulse 71   Temp 97.7 °F (36.5 °C) (Oral)   Resp 16   Ht 1.651 m (5' 5\")   Wt 99.3 kg (219 lb)   SpO2 100%   BMI 36.44 kg/m²   General apperance:  Awake, alert, not in distress.  HEENT: slightly pale  conjunctiva, unicteric sclera, moist oral mucosa.  Chest:  clear  Cardiovascular:  RRR ,S1S2, no murmur or gallop.  Abdomen:  Soft, non tender to palpation.  Extremities: dressed lumbar wound  Skin:  Warm and dry. +edema  CNS:awake and oriented          LABS:     CBC:   Recent Labs     08/06/24  1619 08/07/24  0546 08/08/24  0542   WBC 8.0 6.4 8.0   HGB 8.8* 7.7* 8.3*    228 245     BMP:    Recent Labs     08/06/24  1619 08/07/24  0546    138   K 4.4 4.3    106   CO2 22* 21*   BUN 39* 37*   CREATININE 1.5* 1.3*   GLUCOSE 135* 122*     Calcium:  Recent Labs     08/07/24  0546   CALCIUM 10.4     Ionized Calcium:Invalid input(s): \"IONCA\"  Magnesium:  Recent Labs     08/07/24  0546   MG 1.6     Phosphorus:No results for input(s): \"PHOS\" in the last 72 hours.  BNP:No results for input(s): \"BNP\" in the last 72 hours.  Glucose:  Recent Labs     08/07/24  1553 08/07/24 2008 08/08/24  0608   POCGLU 319* 344* 215*          UA:   Recent Labs     08/06/24  1949   PHUR 5.5   COLORU YELLOW   PROTEINU NEGATIVE   BLOODU TRACE*   RBCUA NONE SEEN   WBCUA > 200   BACTERIA MODERATE   NITRU POSITIVE*   GLUCOSEU NEGATIVE   BILIRUBINUR NEGATIVE   UROBILINOGEN 0.2   KETUA NEGATIVE   LABCAST NONE SEEN  NONE SEEN          Problem list of patient      Patient Active Problem List   Diagnosis Code    Spinal stenosis of lumbar region with neurogenic claudication M48.062    Controlled type 2

## 2024-08-08 NOTE — PLAN OF CARE
Problem: Discharge Planning  Goal: Discharge to home or other facility with appropriate resources  8/7/2024 2154 by Marcella Rodriguez, RN  Outcome: Progressing  Flowsheets (Taken 8/7/2024 2154)  Discharge to home or other facility with appropriate resources: Identify barriers to discharge with patient and caregiver     Problem: Safety - Adult  Goal: Free from fall injury  8/7/2024 2154 by Marcella Rodriguez, RN  Outcome: Progressing  Flowsheets (Taken 8/7/2024 2154)  Free From Fall Injury: Instruct family/caregiver on patient safety     Problem: Pain  Goal: Verbalizes/displays adequate comfort level or baseline comfort level  8/7/2024 2154 by Marcella Rodriguez RN  Outcome: Progressing  Flowsheets (Taken 8/7/2024 2154)  Verbalizes/displays adequate comfort level or baseline comfort level:   Assess pain using appropriate pain scale   Encourage patient to monitor pain and request assistance      

## 2024-08-08 NOTE — CARE COORDINATION
8/8/24, 2:30 PM EDT    DISCHARGE ON GOING EVALUATION    Tawny LUZ [facility] day: 0  Location: 7-03/003-A Reason for admit: Open wound of lumbar region [S31.000A]  Postoperative wound dehiscence, initial encounter [T81.31XA]     Procedures: 8/7 by Dr Angel Tuttle incisional debridement and excision of wound edge of lumbar spine wound with closure of wound following debridement, all suprafascial.     Imaging since last note: na    Barriers to Discharge: await culture results per ID, ortho spine pain control and therapy, hospitalist follows.     PCP: Gregg Horne MD  Readmission Risk Score: 7      Patient Goals/Plan/Treatment Preferences: Tawny plans home with [facility].    2:39 PM  Faxed updated notes to [facility]; called [facility] with update. We need new HH order at time of discharge.

## 2024-08-08 NOTE — PROGRESS NOTES
Department of Orthopedic Surgery  Spine Service  Progress Note        Subjective:   8/8/24  Tawny is resting in the chair doing well. Surgical pain controlled. Dr. Charles consulted. Await abx plan prior to discharge. Overall doing well.     Vitals  VITALS:  /60   Pulse 62   Temp 98.1 °F (36.7 °C) (Oral)   Resp 18   Ht 1.651 m (5' 5\")   Wt 99.3 kg (219 lb)   SpO2 98%   BMI 36.44 kg/m²   24HR INTAKE/OUTPUT:    Intake/Output Summary (Last 24 hours) at 8/8/2024 0651  Last data filed at 8/8/2024 0318  Gross per 24 hour   Intake 1500 ml   Output 20 ml   Net 1480 ml     URINARY CATHETER OUTPUT (Disla):     DRAIN/TUBE OUTPUT:     VENT SETTINGS:     Additional Respiratory Assessments  Pulse: 62  Respirations: 18  SpO2: 98 %      PHYSICAL EXAM:    Orientation:  alert and oriented to person, place and time    Incision:  dressing in place, clean, dry, and intact    Lower Extremity Motor :  quadriceps, extensor hallucis longus, dorsiflexion, plantarflexion 5/5 bilaterally  Lower Extremity Sensory:  Intact L1-S1    Flatus:  negative    ABNORMAL EXAM FINDINGS:  none    LABS:  Recent Labs     08/08/24  0542   HGB 8.3*   HCT 26.8*       ASSESSMENT AND PLAN:    Post operative day 1, s/p lumbar wound I&D with wound closure    1:  Monitor labs and drain output  2:  Activity Level:  OOB with therapy and staff  3:  Pain Control:  controlled  4:  Discharge Planning:  Pending clinical course. Await abx plan from Dr. Charles.     Electronically signed by David Villanueva PA-C on 8/8/2024 at 6:49 AM

## 2024-08-08 NOTE — PROGRESS NOTES
[facility]  INPATIENT OCCUPATIONAL THERAPY  [facility]K  EVALUATION    Time:    Time In: 815  Time Out: 08  Timed Code Treatment Minutes: 32 Minutes  Minutes: 40          Date: 2024  Patient Name: Tawny Rico,   Gender: female      MRN: [id number]  : 1955  (69 y.o.)  Referring Practitioner: Edmundo Joya PA-C  Diagnosis: open wound of lumber region  Additional Pertinent Hx: Per H&P: \"Tawny is a 69-year-old female who presents to the office with complaints of a lumbar open incision.  She is a patient who had previously underwent lumbar spine surgery and instrumentation with Dr. Ortiz and on 2020 for for lower extremity radicular symptoms and did continue to struggle with lumbar wound healing and was previously following with [facility] where care clinic with the use of oral antibiotics and wound care.  She was most recently seen by them on the date of 2024 for for continued nonhealing wound care in which it was decided further evaluation with our office for return to the OR for an I&D and wound closure due to a continued nonhealing wound postsurgery back in May 2024.  She is a patient who deals with diabetes with her most recent A1c 7.1.  She reports continued increasing her protein as she was previously dealing with nutritional support postsurgery.  Due to ongoing difficulty with lumbar wound healing in which she has maximized outpatient wound clinic care it was decided she be admitted to [facility] for further evaluation and plan to return to the OR for an I&D and wound care with a consultation with infectious disease for antibiotic treatments and supportive care postsurgery.\" S/p lumbar spine dehiscinence     Restrictions/Precautions:  Restrictions/Precautions: General Precautions, Fall Risk, Contact Precautions  Position Activity Restriction  Spinal Precautions: No Bending, No Lifting, No Twisting  Other position/activity  extremely weak FMC in ADL tasks    SENSATION:   WFL    ADL:   Grooming: Stand By Assistance.  Pt washes face and brushes teeth while standing at sink side. Pt requires assist to remove cap of toothpaste d/t decreased FM strength.  .    IADL:   Not Tested    BALANCE:  Sitting Balance:  Modified Independent. Sitting in recliner  Standing Balance: Supervision. Static and dynamic standing    BED MOBILITY:  Not Tested    TRANSFERS:  Sit to Stand:  Contact Guard Assistance. From recliner  Stand to Sit: Contact Guard Assistance. To recliner    FUNCTIONAL MOBILITY:  Assistive Device: Rollator  Assist Level:  Contact Guard Assistance.   Distance: To and from bathroom and Household distances within unit  Pt demos slow pace with BLE external rotation. Pt demos increased rest breaks in standing. Pt demos good walker safety and management.        -[facility] Inpatient Daily Activity Raw Score: 22  AM-PAC Inpatient ADL T-Scale Score : 47.1  ADL Inpatient CMS 0-100% Score: 25.8    Activity Tolerance:  Patient tolerance of  treatment: Good treatment tolerance and Reduced activity pace        Assessment:     Performance deficits / Impairments: Decreased functional mobility , Decreased ADL status, Decreased ROM, Decreased strength, Decreased fine motor control, Decreased endurance, Decreased high-level IADLs, Decreased balance  Prognosis: Good  REQUIRES OT FOLLOW-UP: Yes  Decision Making: Medium Complexity    Treatment Initiated: Treatment and education initiated within context of evaluation.  Evaluation time included review of current medical information, gathering information related to past medical, social and functional history, completion of standardized testing, formal and informal observation of tasks, assessment of data and development of plan of care and goals.  Treatment time included skilled education and facilitation of tasks to increase safety and independence with ADL's for improved functional independence and quality of

## 2024-08-08 NOTE — PLAN OF CARE
Problem: Chronic Conditions and Co-morbidities  Goal: Patient's chronic conditions and co-morbidity symptoms are monitored and maintained or improved  Outcome: Progressing  Flowsheets (Taken 8/8/2024 0838)  Care Plan - Patient's Chronic Conditions and Co-Morbidity Symptoms are Monitored and Maintained or Improved:   Monitor and assess patient's chronic conditions and comorbid symptoms for stability, deterioration, or improvement   Collaborate with multidisciplinary team to address chronic and comorbid conditions and prevent exacerbation or deterioration   Update acute care plan with appropriate goals if chronic or comorbid symptoms are exacerbated and prevent overall improvement and discharge     Problem: Discharge Planning  Goal: Discharge to home or other facility with appropriate resources  8/8/2024 0838 by Kisha Flores LPN  Outcome: Progressing  Flowsheets (Taken 8/8/2024 0838)  Discharge to home or other facility with appropriate resources:   Identify barriers to discharge with patient and caregiver   Arrange for needed discharge resources and transportation as appropriate   Identify discharge learning needs (meds, wound care, etc)   Arrange for interpreters to assist at discharge as needed   Refer to discharge planning if patient needs post-hospital services based on physician order or complex needs related to functional status, cognitive ability or social support system     Problem: Safety - Adult  Goal: Free from fall injury  8/8/2024 0838 by Kisha Flores LPN  Outcome: Progressing  Flowsheets (Taken 8/8/2024 0838)  Free From Fall Injury:   Based on caregiver fall risk screen, instruct family/caregiver to ask for assistance with transferring infant if caregiver noted to have fall risk factors   Instruct family/caregiver on patient safety     Problem: Pain  Goal: Verbalizes/displays adequate comfort level or baseline comfort level  8/8/2024 0838 by Kisha Flores LPN  Outcome:

## 2024-08-09 VITALS
WEIGHT: 219 LBS | HEIGHT: 65 IN | OXYGEN SATURATION: 100 % | RESPIRATION RATE: 20 BRPM | HEART RATE: 70 BPM | BODY MASS INDEX: 36.49 KG/M2 | TEMPERATURE: 98.9 F | DIASTOLIC BLOOD PRESSURE: 56 MMHG | SYSTOLIC BLOOD PRESSURE: 140 MMHG

## 2024-08-09 LAB
BACTERIA UR CULT: ABNORMAL
BASOPHILS ABSOLUTE: 0 THOU/MM3 (ref 0–0.1)
BASOPHILS NFR BLD AUTO: 0.4 %
DEPRECATED RDW RBC AUTO: 57.3 FL (ref 35–45)
EOSINOPHIL NFR BLD AUTO: 1 %
EOSINOPHILS ABSOLUTE: 0.1 THOU/MM3 (ref 0–0.4)
ERYTHROCYTE [DISTWIDTH] IN BLOOD BY AUTOMATED COUNT: 17.8 % (ref 11.5–14.5)
GLUCOSE BLD STRIP.AUTO-MCNC: 242 MG/DL (ref 70–108)
GLUCOSE BLD STRIP.AUTO-MCNC: 267 MG/DL (ref 70–108)
HCT VFR BLD AUTO: 26.1 % (ref 37–47)
HGB BLD-MCNC: 8.1 GM/DL (ref 12–16)
IMM GRANULOCYTES # BLD AUTO: 0.02 THOU/MM3 (ref 0–0.07)
IMM GRANULOCYTES NFR BLD AUTO: 0.3 %
LYMPHOCYTES ABSOLUTE: 1.8 THOU/MM3 (ref 1–4.8)
LYMPHOCYTES NFR BLD AUTO: 22.8 %
MCH RBC QN AUTO: 27.1 PG (ref 26–33)
MCHC RBC AUTO-ENTMCNC: 31 GM/DL (ref 32.2–35.5)
MCV RBC AUTO: 87.3 FL (ref 81–99)
MONOCYTES ABSOLUTE: 0.8 THOU/MM3 (ref 0.4–1.3)
MONOCYTES NFR BLD AUTO: 10.1 %
NEUTROPHILS ABSOLUTE: 5.2 THOU/MM3 (ref 1.8–7.7)
NEUTROPHILS NFR BLD AUTO: 65.4 %
NRBC BLD AUTO-RTO: 0 /100 WBC
ORGANISM: ABNORMAL
PLATELET # BLD AUTO: 213 THOU/MM3 (ref 130–400)
PMV BLD AUTO: 8.7 FL (ref 9.4–12.4)
RBC # BLD AUTO: 2.99 MILL/MM3 (ref 4.2–5.4)
WBC # BLD AUTO: 7.9 THOU/MM3 (ref 4.8–10.8)

## 2024-08-09 PROCEDURE — 6370000000 HC RX 637 (ALT 250 FOR IP): Performed by: INTERNAL MEDICINE

## 2024-08-09 PROCEDURE — 36415 COLL VENOUS BLD VENIPUNCTURE: CPT

## 2024-08-09 PROCEDURE — G0378 HOSPITAL OBSERVATION PER HR: HCPCS

## 2024-08-09 PROCEDURE — 2580000003 HC RX 258: Performed by: PHYSICIAN ASSISTANT

## 2024-08-09 PROCEDURE — 6370000000 HC RX 637 (ALT 250 FOR IP): Performed by: PHYSICIAN ASSISTANT

## 2024-08-09 PROCEDURE — 6370000000 HC RX 637 (ALT 250 FOR IP): Performed by: NURSE PRACTITIONER

## 2024-08-09 PROCEDURE — 99232 SBSQ HOSP IP/OBS MODERATE 35: CPT | Performed by: INTERNAL MEDICINE

## 2024-08-09 PROCEDURE — 97530 THERAPEUTIC ACTIVITIES: CPT

## 2024-08-09 PROCEDURE — 97116 GAIT TRAINING THERAPY: CPT

## 2024-08-09 PROCEDURE — 82948 REAGENT STRIP/BLOOD GLUCOSE: CPT

## 2024-08-09 PROCEDURE — 85025 COMPLETE CBC W/AUTO DIFF WBC: CPT

## 2024-08-09 RX ORDER — DOXYCYCLINE HYCLATE 100 MG
100 TABLET ORAL EVERY 12 HOURS SCHEDULED
Qty: 14 TABLET | Refills: 0 | Status: SHIPPED | OUTPATIENT
Start: 2024-08-09 | End: 2024-08-16

## 2024-08-09 RX ORDER — CYCLOBENZAPRINE HCL 10 MG
10 TABLET ORAL 3 TIMES DAILY PRN
Qty: 42 TABLET | Refills: 0 | Status: SHIPPED | OUTPATIENT
Start: 2024-08-09 | End: 2024-08-19

## 2024-08-09 RX ORDER — OXYCODONE HYDROCHLORIDE 5 MG/1
5 TABLET ORAL EVERY 4 HOURS PRN
Qty: 42 TABLET | Refills: 0 | Status: SHIPPED | OUTPATIENT
Start: 2024-08-09 | End: 2024-08-16

## 2024-08-09 RX ADMIN — POLYETHYLENE GLYCOL 3350 17 G: 17 POWDER, FOR SOLUTION ORAL at 10:02

## 2024-08-09 RX ADMIN — CYCLOBENZAPRINE 10 MG: 10 TABLET, FILM COATED ORAL at 14:36

## 2024-08-09 RX ADMIN — NALOXEGOL OXALATE 12.5 MG: 12.5 TABLET, FILM COATED ORAL at 10:02

## 2024-08-09 RX ADMIN — SODIUM CHLORIDE, PRESERVATIVE FREE 10 ML: 5 INJECTION INTRAVENOUS at 10:02

## 2024-08-09 RX ADMIN — INSULIN LISPRO 2 UNITS: 100 INJECTION, SOLUTION INTRAVENOUS; SUBCUTANEOUS at 11:41

## 2024-08-09 RX ADMIN — ATENOLOL 50 MG: 50 TABLET ORAL at 10:02

## 2024-08-09 RX ADMIN — DOXYCYCLINE HYCLATE 100 MG: 100 TABLET, COATED ORAL at 10:02

## 2024-08-09 RX ADMIN — OXYCODONE HYDROCHLORIDE 5 MG: 5 TABLET ORAL at 14:36

## 2024-08-09 RX ADMIN — INSULIN LISPRO 1 UNITS: 100 INJECTION, SOLUTION INTRAVENOUS; SUBCUTANEOUS at 09:59

## 2024-08-09 RX ADMIN — OXYCODONE HYDROCHLORIDE 10 MG: 5 TABLET ORAL at 04:51

## 2024-08-09 ASSESSMENT — PAIN DESCRIPTION - ORIENTATION
ORIENTATION: MID;LOWER
ORIENTATION: LOWER

## 2024-08-09 ASSESSMENT — PAIN SCALES - WONG BAKER
WONGBAKER_NUMERICALRESPONSE: NO HURT
WONGBAKER_NUMERICALRESPONSE: HURTS A LITTLE BIT
WONGBAKER_NUMERICALRESPONSE: NO HURT

## 2024-08-09 ASSESSMENT — PAIN SCALES - GENERAL
PAINLEVEL_OUTOF10: 6
PAINLEVEL_OUTOF10: 8

## 2024-08-09 ASSESSMENT — PAIN DESCRIPTION - DESCRIPTORS
DESCRIPTORS: ACHING;DISCOMFORT
DESCRIPTORS: ACHING

## 2024-08-09 ASSESSMENT — PAIN DESCRIPTION - LOCATION
LOCATION: BACK
LOCATION: BACK

## 2024-08-09 ASSESSMENT — PAIN DESCRIPTION - PAIN TYPE: TYPE: SURGICAL PAIN

## 2024-08-09 ASSESSMENT — PAIN DESCRIPTION - ONSET: ONSET: ON-GOING

## 2024-08-09 ASSESSMENT — PAIN DESCRIPTION - FREQUENCY: FREQUENCY: INTERMITTENT

## 2024-08-09 ASSESSMENT — PAIN - FUNCTIONAL ASSESSMENT: PAIN_FUNCTIONAL_ASSESSMENT: ACTIVITIES ARE NOT PREVENTED

## 2024-08-09 NOTE — PROGRESS NOTES
Department of Orthopedic Surgery  Spine Service  Progress Note        Subjective:   8/8/24  Tawny is resting in the chair doing well. Surgical pain controlled. Dr. Charles consulted. Await abx plan prior to discharge. Overall doing well.     8/9/24  Tawny is resting in the chair doing well. +MRSA on lumbar wound culture. UTI + Pseudomonas. Abx per Dr. Charles prior to discharge.     Vitals  VITALS:  BP (!) 153/71   Pulse 83   Temp 98.1 °F (36.7 °C) (Oral)   Resp 16   Ht 1.651 m (5' 5\")   Wt 99.3 kg (219 lb)   SpO2 98%   BMI 36.44 kg/m²   24HR INTAKE/OUTPUT:    Intake/Output Summary (Last 24 hours) at 8/9/2024 0646  Last data filed at 8/9/2024 0441  Gross per 24 hour   Intake 1510 ml   Output --   Net 1510 ml     URINARY CATHETER OUTPUT (Disla):     DRAIN/TUBE OUTPUT:     VENT SETTINGS:     Additional Respiratory Assessments  Pulse: 83  Respirations: 16  SpO2: 98 %      PHYSICAL EXAM:    Orientation:  alert and oriented to person, place and time    Incision:  dressing in place, clean, dry, and intact    Lower Extremity Motor :  quadriceps, extensor hallucis longus, dorsiflexion, plantarflexion 5/5 bilaterally  Lower Extremity Sensory:  Intact L1-S1    Flatus:  negative    ABNORMAL EXAM FINDINGS:  none    LABS:  Recent Labs     08/08/24  0542   HGB 8.3*   HCT 26.8*       ASSESSMENT AND PLAN:    Post operative day 2, s/p lumbar wound I&D with wound closure    1:  Monitor labs and drain output  2:  Activity Level:  OOB with therapy and staff  3:  Pain Control:  controlled  4:  Discharge Planning:  Pending clinical course. Await abx plan from Dr. Charles.   5:  Wound culture +MRSA  6:  UTI +Pseudomonas    Electronically signed by David Villanueva PA-C on 8/9/2024 at 6:46 AM

## 2024-08-09 NOTE — PROGRESS NOTES
Progress Note    Patient:  Tawny Rico    Unit/Bed:7K-03/003-A  YOB: 1955  MRN: [id number]   Acct: [id number]   Admit date: 8/6/2024      Principal Problem:    Open wound of lumbar region  Active Problems:    Postoperative wound dehiscence, initial encounter    Acute kidney injury superimposed on chronic kidney disease (HCC)    Hypercalcemia    Postoperative infection    Type 2 diabetes mellitus with hyperglycemia, without long-term current use of insulin (HCC)  Resolved Problems:    * No resolved hospital problems. *      May continue Januvia and metformin on discharge and continue doxycycline on discharge cleared for discharge from medical standpoint    Assessment and Plan:  Nonhealing wound of the lumbar spine (POA) S/P sharp incisional debridement and excision of wound edge of lumbar spine wound with closure of wound following debridement, all suprafascial on 8/7/2020--Per orthopedics; Ancef from 8/7; infectious disease consulted by orthopedic spine  Diabetes mellitus type 2, uncontrolled--hemoglobin A1c noted to be 7.2 on May 3, 2024; on low-dose sliding scale; at home takes Glucophage 1000 mg twice daily and Januvia 100 mg daily  CAROL--baseline creatinine around 1.9, improved to 1.3, stable  Hypercalcemia--resulted to normal  Normocytic anemia--monitor, transfuse if hemoglobin less than 7.0 or hemodynamically unstable; stable  Primary hypertension, uncontrolled--Tenormin on hold, monitor  Asymptomatic bacteriuria--urine cultures growing nonfermenting gram-negative bacilli with colony count greater than 100,000, patient denies any complaints, infectious disease has been consulted with #1 and discussed and no need to treat since patient is asymptomatic  Status post removal of hardware L4-L5, L5 and S1, posterior lateral interbody fusion, L2-L4 laminectomy and posterior spine fusion with local bone graft, demineralized bone matrix and instrumentation completed on May 1,

## 2024-08-09 NOTE — CARE COORDINATION
8/9/24, 9:28 AM EDT    DISCHARGE ON GOING EVALUATION    Tawny LUZ Jacky       [facility] day: 0  Location: 7K-03/003-A Reason for admit: Open wound of lumbar region [S31.000A]  Postoperative wound dehiscence, initial encounter [T81.31XA]     Procedures:   8/7 by Dr Angel Tuttle incisional debridement and excision of wound edge of lumbar spine wound with closure of wound following debridement, all suprafascial.    Imaging since last note: na    Barriers to Discharge: Pt c/o burning with urination; Fosfomycin x 1 dose. Need antibiotic plan per ID.    PCP: Gregg Horne MD  Readmission Risk Score: 7    Patient Goals/Plan/Treatment Preferences: Tawny plans home with [facility]; await antibiotic plan per ID.    Home with oral antibiotics per ID. Hospitalist okay with discharge.    10:38 AM  Messaged [facility] for discharge order and HH order.        11:32 AM  HH order and AVS faxed to [facility].    8/9/24, 10:38 AM EDT    Patient goals/plan/ treatment preferences discussed by  and .  Patient goals/plan/ treatment preferences reviewed with patient/ family.  Patient/ family verbalize understanding of discharge plan and are in agreement with goal/plan/treatment preferences.  Understanding was demonstrated using the teach back method.  AVS provided by RN at time of discharge, which includes all necessary medical information pertaining to the patients current course of illness, treatment, post-discharge goals of care, and treatment preferences.     Services At/After Discharge: Home Health and Nursing service

## 2024-08-09 NOTE — PROGRESS NOTES
Patient discharged to home with daughter. Patient left with all belongings, AVS, prescriptions/ medications from OP pharmacy. Patient sent home with [facility] soap for infection prevention. All questions answered at this time. Patient has follow up appointments scheduled with PCP, wound clinic, home health and  [facility].

## 2024-08-09 NOTE — PROGRESS NOTES
Progress note: Infectious diseases    Patient - Tawny Rico,  Age - 69 y.o.    - 1955      Room Number - 7K-03/003-A   MRN -  [id number]   [facility] # - [id number]  Date of Admission -  2024  1:21 PM    SUBJECTIVE:   She has no fever or chills, had hx of recurrent UTI. She has follow up with urology for chronic bladder issues  Had received a one time dose of fosfomycin  OBJECTIVE   VITALS    height is 1.651 m (5' 5\") and weight is 99.3 kg (219 lb). Her oral temperature is 98.9 °F (37.2 °C). Her blood pressure is 140/56 (abnormal) and her pulse is 70. Her respiration is 20 and oxygen saturation is 100%.       Wt Readings from Last 3 Encounters:   24 99.3 kg (219 lb)   24 106.9 kg (235 lb 10.8 oz)   24 101.2 kg (223 lb 1.7 oz)       I/O (24 Hours)    Intake/Output Summary (Last 24 hours) at 2024 1007  Last data filed at 2024 0955  Gross per 24 hour   Intake 1750 ml   Output --   Net 1750 ml       General Appearance  Awake, alert, oriented,  not  In acute distress  HEENT - normocephalic, atraumatic, slightly pale  conjunctiva,  anicteric sclera  Neck - Supple, no mass  Lungs -  Bilateral   air entry, no rhonchi, no wheeze  Cardiovascular - Heart sounds are normal.     Abdomen - soft, not distended, nontender,   Neurologic -oriented  Skin - No bruising or bleeding  Extremities - leg edema  MEDICATIONS:      atenolol  50 mg Oral Daily    doxycycline hyclate  100 mg Oral 2 times per day    sodium chloride flush  5-40 mL IntraVENous 2 times per day    polyethylene glycol  17 g Oral Daily    naloxegol  12.5 mg Oral QAM    sodium chloride flush  5-40 mL IntraVENous 2 times per day    [Held by provider] bethanechol  25 mg Oral TID    [Held by provider] cetirizine  10 mg Oral Daily    [Held by provider] tamsulosin  0.4 mg Oral Daily    [Held by provider] vitamin B-12  2,000 mcg Oral Daily    insulin

## 2024-08-09 NOTE — PLAN OF CARE
Problem: Chronic Conditions and Co-morbidities  Goal: Patient's chronic conditions and co-morbidity symptoms are monitored and maintained or improved  Outcome: Progressing  Flowsheets (Taken 8/8/2024 2005)  Care Plan - Patient's Chronic Conditions and Co-Morbidity Symptoms are Monitored and Maintained or Improved:   Monitor and assess patient's chronic conditions and comorbid symptoms for stability, deterioration, or improvement   Collaborate with multidisciplinary team to address chronic and comorbid conditions and prevent exacerbation or deterioration   Update acute care plan with appropriate goals if chronic or comorbid symptoms are exacerbated and prevent overall improvement and discharge     Problem: Discharge Planning  Goal: Discharge to home or other facility with appropriate resources  Outcome: Progressing  Flowsheets (Taken 8/8/2024 2005)  Discharge to home or other facility with appropriate resources:   Identify barriers to discharge with patient and caregiver   Arrange for needed discharge resources and transportation as appropriate   Identify discharge learning needs (meds, wound care, etc)   Refer to discharge planning if patient needs post-hospital services based on physician order or complex needs related to functional status, cognitive ability or social support system     Problem: Safety - Adult  Goal: Free from fall injury  Outcome: Progressing  Flowsheets (Taken 8/8/2024 0838 by Kisha Flores LPN)  Free From Fall Injury:   Based on caregiver fall risk screen, instruct family/caregiver to ask for assistance with transferring infant if caregiver noted to have fall risk factors   Instruct family/caregiver on patient safety     Problem: Pain  Goal: Verbalizes/displays adequate comfort level or baseline comfort level  Outcome: Progressing  Flowsheets (Taken 8/8/2024 2005)  Verbalizes/displays adequate comfort level or baseline comfort level:   Encourage patient to monitor pain and request

## 2024-08-09 NOTE — PROGRESS NOTES
[facility]  INPATIENT PHYSICAL THERAPY  DAILY NOTE  [facility] ORTHOP[facility] 7K - 7K-03/003-A      Discharge Recommendations: Continue to assess pending progress, Home with Home Health PT, and Patient would benefit from continued PT at discharge  Equipment Recommendations:Equipment Needed: No    Time In: 1039  Time Out: 1108  Timed Code Treatment Minutes: 29 Minutes  Minutes: 29          Date: 2024  Patient Name: Tawny Rico,  Gender:  female        MRN: [id number]  : 1955  (69 y.o.)     Referring Practitioner: Edmundo Joya PA-C  Diagnosis: Open Wound of lumbar region  Additional Pertinent Hx: Per EMR:Tawny is a 69-year-old female who presents to the office with complaints of a lumbar open incision.  She is a patient who had previously underwent lumbar spine surgery and instrumentation with Dr. Ortiz and on 2020 for for lower extremity radicular symptoms and did continue to struggle with lumbar wound healing and was previously following with [facility] where care clinic with the use of oral antibiotics and wound care.  She was most recently seen by them on the date of 2024 for for continued nonhealing wound care in which it was decided further evaluation with our office for return to the OR for an I&D and wound closure due to a continued nonhealing wound postsurgery back in May 2024.  She is a patient who deals with diabetes with her most recent A1c 7.1.  She reports continued increasing her protein as she was previously dealing with nutritional support postsurgery.  Due to ongoing difficulty with lumbar wound healing in which she has maximized outpatient wound clinic care it was decided she be admitted to [facility] for further evaluation and plan to return to the OR for an I&D and wound care with a consultation with infectious disease for antibiotic treatments and supportive care postsurgery.\" S/p lumbar spine dehiscinence      Prior Level of  Level Tile  Device: 4 Wheeled Walker  Gait Deviations: Forward Flexed Posture, Slow Anika, Decreased Step Length Bilaterally, Decreased Gait Speed,   *Increased time for completed due to slow pace.   Stairs:  Platform: 6\" platform X 1 using 4 Wheeled Walker and Contact Guard Assistance, Minimal Assistance.  *Arelis to ascend   Balance:  Static Sitting Balance:  Stand By Assistance  Static Standing Balance: Stand By Assistance  Dynamic Standing Balance: Stand By Assistance, Contact Guard Assistance    Exercise:  None  HEP given for seated exercises with verbalized understanding and demonstration provided.   Functional Outcome Measures:  [facility] (6 CLICK) BASIC MOBILITY  AM-PAC Inpatient Mobility Raw Score : 18  AM-PAC Inpatient T-Scale Score : 43.63  Mobility Inpatient CMS 0-100% Score: 46.58        Modified [place] Scale:  Not Applicable    ASSESSMENT:  Assessment: Patient progressing toward established goals.  Activity Tolerance:  Patient tolerance of  treatment:Good.  Plan: Current Treatment Recommendations: Strengthening, Balance training, Functional mobility training, Gait training, Stair training, Safety education & training, Patient/Caregiver education & training, Therapeutic activities  General Plan:  (5xO)    Education:  Learners: Patient  Patient Education: Plan of Care, Precautions/Restrictions, Transfers, Gait,  - Patient Verbalized Understanding, - Patient Requires Continued Education    Goals:  Patient Goals : Pt goal to return home  Short Term Goals  Time Frame for Short Term Goals: By discharge  Short Term Goal 1: Supine to/from sit at Mod I in order to get in/out of bed.  Short Term Goal 2: Sit to/from stand at Mod I in order to get up to walk.  Short Term Goal 3: Ambulate 150 feet with rollator at Mod I in order to walk in home safely.  Short Term Goal 4: Ascend/Descend 1 platform step with rollator at Mod I to enter/exit home.  Long Term Goals  Time Frame for Long Term Goals : NA due to short

## 2024-08-10 LAB
BACTERIA UR CULT: ABNORMAL
ORGANISM: ABNORMAL

## 2024-08-11 LAB
BACTERIA SPEC AEROBE CULT: ABNORMAL
BACTERIA SPEC ANAEROBE CULT: ABNORMAL
GRAM STN SPEC: ABNORMAL
ORGANISM: ABNORMAL

## 2024-08-23 ENCOUNTER — TELEPHONE (OUTPATIENT)
Dept: WOUND CARE | Age: 69
End: 2024-08-23

## 2024-08-26 ENCOUNTER — HOSPITAL ENCOUNTER (OUTPATIENT)
Dept: WOUND CARE | Age: 69
Discharge: HOME | End: 2024-08-26

## (undated) DEVICE — 450 ML BOTTLE OF 0.05% CHLORHEXIDINE GLUCONATE IN 99.95% STERILE WATER FOR IRRIGATION, USP AND APPLICATOR.: Brand: IRRISEPT ANTIMICROBIAL WOUND LAVAGE

## (undated) DEVICE — SPONGE LAP W18XL18IN WHT COT 4 PLY FLD STRUNG RADPQ DISP ST 2 PER PACK

## (undated) DEVICE — SPONGE,PEANUT,XRAY,ST,SM,3/8",5/CARD: Brand: MEDLINE INDUSTRIES, INC.

## (undated) DEVICE — GOWN,SIRUS,NON REINFRCD,LARGE,SET IN SL: Brand: MEDLINE

## (undated) DEVICE — BLADE ES L6IN ELASTOMERIC COAT EXT DURABLE BEND UPTO 90DEG

## (undated) DEVICE — SUTURE ETHILON SZ 3-0 L30IN NONABSORBABLE BLK L30MM PSLX 3/8 1691H

## (undated) DEVICE — PAD OP RM W20XL72XH2IN PRECIS CUT FLAT EC BIOCLINIC

## (undated) DEVICE — SUTURE VICRYL ABSRB BRAID COAT UD CP NO 2 27IN  J195H

## (undated) DEVICE — DRESSING HYDROFIBER AQUACEL AG ADVANTAGE 3.5X12 IN

## (undated) DEVICE — SUTURE VICRYL + SZ 2-0 L27IN ABSRB UD CP-1 1/2 CIR REV CUT VCP266H

## (undated) DEVICE — DRESSING TRNSPAR W5XL4.5IN FLM SHT SEMIPERMEABLE WIND

## (undated) DEVICE — SUTURE ETHILON SZ 3-0 L18IN NONABSORBABLE BLK L24MM FS-1 3/8 663G

## (undated) DEVICE — DRAIN SURG 10FR PVC TB W/ TRCR MID PERF NO RESVR HUBLESS

## (undated) DEVICE — SOLUTION PREP PAINT POV IOD FOR SKIN MUCOUS MEM

## (undated) DEVICE — GAUZE,SPONGE,8"X4",12PLY,XRAY,STRL,LF: Brand: MEDLINE

## (undated) DEVICE — PACK-MAJOR

## (undated) DEVICE — BIPOLAR SEALER 23-112-1 AQM 6.0: Brand: AQUAMANTYS ®

## (undated) DEVICE — PREMIUM DRY TRAY LF: Brand: MEDLINE INDUSTRIES, INC.

## (undated) DEVICE — STRIP,CLOSURE,WOUND,MEDI-STRIP,1/2X4: Brand: MEDLINE

## (undated) DEVICE — SPK10281 JACKSON TABLE KIT: Brand: SPK10281 JACKSON TABLE KIT

## (undated) DEVICE — 1LYRTR 16FR10ML100%SILTMPS SNP: Brand: MEDLINE INDUSTRIES, INC.

## (undated) DEVICE — GLOVE ORANGE PI 7   MSG9070

## (undated) DEVICE — AGENT HEMOSTATIC SURGIFLOW MATRIX KIT W/THROMBIN

## (undated) DEVICE — BUR RND FLUT AGRSV 5MM

## (undated) DEVICE — Device

## (undated) DEVICE — HEAD POSITIONER, SURGICAL: Brand: DEROYAL

## (undated) DEVICE — RESERVOIR BLD COLLCTN BTM OUTLETXTRAXRES

## (undated) DEVICE — SUTURE ABSORBABLE MONOFILAMENT 2-0 CT-1 24 CM 36 MM VIO PDS+

## (undated) DEVICE — SET AUTOTRNS C175ML BOWL BTM OUTLT RESERVOIRXTRA

## (undated) DEVICE — DRESSING TRNSPAR W8XL12IN FLM SURESITE 123

## (undated) DEVICE — PROBE STIM 3 MM FOR PEDCL SCREW DISP

## (undated) DEVICE — LINE ASPIR 1/4 ANTICOAG

## (undated) DEVICE — SUTURE ETHILON SZ 1-0 L30IN NONABSORBABLE BLK LR L75MM 3/8 489T

## (undated) DEVICE — SUTURE N ABSRB MONOFILAMENT 2-0 FSLX 75 CM 36 MM ETHILON

## (undated) DEVICE — GLOVE SURG SZ 65 THK91MIL LTX FREE SYN POLYISOPRENE

## (undated) DEVICE — KIT EVAC 400CC PVC RADPQ Y CONN

## (undated) DEVICE — SUTURE STRATAFIX SYMMETRIC SZ 1 L18IN ABSRB VLT CT1 L36CM SXPP1A404

## (undated) DEVICE — SOLUTION SCRB 4OZ 7.5% POVIDONE IOD ANTIMIC BTL

## (undated) DEVICE — KAIRISON TUBING SET PNEUMATIC, (3000 MM), STERILE, DISPOSABLE, TO BE USED WITH: FK898R, PACKAGE OF 10 PIECES: Brand: KAIRISON

## (undated) DEVICE — THE MILL DISPOSABLE - MEDIUM

## (undated) DEVICE — MASTISOL ADHESIVE LIQ 2/3ML

## (undated) DEVICE — GLOVE SURG SZ 9 THK91MIL LTX FREE SYN POLYISOPRENE ANTI

## (undated) DEVICE — CATHETER IV 16GA L1.16IN OD1.7018-1.7780MM

## (undated) DEVICE — GOWN,SIRUS,NONRNF,SETINSLV,XL,20/CS: Brand: MEDLINE

## (undated) DEVICE — SUTURE ETHILON SZ 2-0 L30IN NONABSORBABLE BLK L36MM FSLX 3/8 1674H